# Patient Record
Sex: FEMALE | Race: WHITE
[De-identification: names, ages, dates, MRNs, and addresses within clinical notes are randomized per-mention and may not be internally consistent; named-entity substitution may affect disease eponyms.]

---

## 2017-01-24 ENCOUNTER — HOSPITAL ENCOUNTER (OUTPATIENT)
Dept: HOSPITAL 62 - OD | Age: 47
End: 2017-01-24
Attending: PHYSICIAN ASSISTANT
Payer: COMMERCIAL

## 2017-01-24 DIAGNOSIS — E87.5: Primary | ICD-10-CM

## 2017-01-24 LAB
ANION GAP SERPL CALC-SCNC: 13 MMOL/L (ref 5–19)
BUN SERPL-MCNC: 17 MG/DL (ref 7–20)
CALCIUM: 9.9 MG/DL (ref 8.4–10.2)
CHLORIDE SERPL-SCNC: 102 MMOL/L (ref 98–107)
CO2 SERPL-SCNC: 23 MMOL/L (ref 22–30)
CREAT SERPL-MCNC: 0.96 MG/DL (ref 0.52–1.25)
GLUCOSE SERPL-MCNC: 191 MG/DL (ref 75–110)
POTASSIUM SERPL-SCNC: 5.8 MMOL/L (ref 3.6–5)
SODIUM SERPL-SCNC: 137.7 MMOL/L (ref 137–145)

## 2017-01-24 PROCEDURE — 80048 BASIC METABOLIC PNL TOTAL CA: CPT

## 2017-01-24 PROCEDURE — 36415 COLL VENOUS BLD VENIPUNCTURE: CPT

## 2019-09-15 ENCOUNTER — HOSPITAL ENCOUNTER (OUTPATIENT)
Dept: HOSPITAL 62 - ER | Age: 49
Setting detail: OBSERVATION
LOS: 4 days | Discharge: HOME | End: 2019-09-19
Attending: INTERNAL MEDICINE | Admitting: FAMILY MEDICINE
Payer: COMMERCIAL

## 2019-09-15 DIAGNOSIS — R11.2: ICD-10-CM

## 2019-09-15 DIAGNOSIS — Z79.82: ICD-10-CM

## 2019-09-15 DIAGNOSIS — E86.0: ICD-10-CM

## 2019-09-15 DIAGNOSIS — D72.829: ICD-10-CM

## 2019-09-15 DIAGNOSIS — E78.5: ICD-10-CM

## 2019-09-15 DIAGNOSIS — R79.89: ICD-10-CM

## 2019-09-15 DIAGNOSIS — E66.9: ICD-10-CM

## 2019-09-15 DIAGNOSIS — R61: ICD-10-CM

## 2019-09-15 DIAGNOSIS — N18.2: ICD-10-CM

## 2019-09-15 DIAGNOSIS — Z79.899: ICD-10-CM

## 2019-09-15 DIAGNOSIS — F17.210: ICD-10-CM

## 2019-09-15 DIAGNOSIS — Z79.4: ICD-10-CM

## 2019-09-15 DIAGNOSIS — Z90.49: ICD-10-CM

## 2019-09-15 DIAGNOSIS — R01.1: ICD-10-CM

## 2019-09-15 DIAGNOSIS — K21.9: ICD-10-CM

## 2019-09-15 DIAGNOSIS — R19.7: ICD-10-CM

## 2019-09-15 DIAGNOSIS — R00.0: ICD-10-CM

## 2019-09-15 DIAGNOSIS — E11.65: ICD-10-CM

## 2019-09-15 DIAGNOSIS — R60.0: ICD-10-CM

## 2019-09-15 DIAGNOSIS — H81.10: Primary | ICD-10-CM

## 2019-09-15 DIAGNOSIS — I12.9: ICD-10-CM

## 2019-09-15 DIAGNOSIS — Z82.49: ICD-10-CM

## 2019-09-15 DIAGNOSIS — E11.22: ICD-10-CM

## 2019-09-15 LAB
ABSOLUTE LYMPHOCYTES# (MANUAL): 1.4 10^3/UL (ref 0.5–4.7)
ABSOLUTE MONOCYTES # (MANUAL): 0.5 10^3/UL (ref 0.1–1.4)
ADD MANUAL DIFF: YES
ALBUMIN SERPL-MCNC: 4.3 G/DL (ref 3.5–5)
ALP SERPL-CCNC: 74 U/L (ref 38–126)
AMYLASE SERPL-CCNC: 46 U/L (ref 30–110)
ANION GAP SERPL CALC-SCNC: 16 MMOL/L (ref 5–19)
ANION GAP SERPL CALC-SCNC: 19 MMOL/L (ref 5–19)
APPEARANCE UR: CLEAR
APTT BLD: 25.5 SEC (ref 23.5–35.8)
APTT PPP: (no result) S
AST SERPL-CCNC: 22 U/L (ref 14–36)
BARBITURATES UR QL SCN: NEGATIVE
BASE EXCESS BLDV CALC-SCNC: -6.6 MMOL/L
BASOPHILS NFR BLD MANUAL: 1 % (ref 0–2)
BILIRUB DIRECT SERPL-MCNC: 0.2 MG/DL (ref 0–0.4)
BILIRUB SERPL-MCNC: 0.7 MG/DL (ref 0.2–1.3)
BILIRUB UR QL STRIP: NEGATIVE
BUN SERPL-MCNC: 14 MG/DL (ref 7–20)
BUN SERPL-MCNC: 16 MG/DL (ref 7–20)
CALCIUM: 9 MG/DL (ref 8.4–10.2)
CALCIUM: 9.7 MG/DL (ref 8.4–10.2)
CHLORIDE SERPL-SCNC: 92 MMOL/L (ref 98–107)
CHLORIDE SERPL-SCNC: 98 MMOL/L (ref 98–107)
CO2 SERPL-SCNC: 23 MMOL/L (ref 22–30)
CO2 SERPL-SCNC: 23 MMOL/L (ref 22–30)
EOSINOPHIL NFR BLD MANUAL: 0 % (ref 0–6)
ERYTHROCYTE [DISTWIDTH] IN BLOOD BY AUTOMATED COUNT: 13.3 % (ref 11.5–14)
GLUCOSE SERPL-MCNC: 345 MG/DL (ref 75–110)
GLUCOSE SERPL-MCNC: 470 MG/DL (ref 75–110)
GLUCOSE UR STRIP-MCNC: >=1000 MG/DL
HCO3 BLDV-SCNC: 18.8 MMOL/L (ref 20–32)
HCT VFR BLD CALC: 40.5 % (ref 36–47)
HGB BLD-MCNC: 13.8 G/DL (ref 12–15.5)
INR PPP: 1.11
KETONES UR STRIP-MCNC: 100 MG/DL
MCH RBC QN AUTO: 28.4 PG (ref 27–33.4)
MCHC RBC AUTO-ENTMCNC: 34.1 G/DL (ref 32–36)
MCV RBC AUTO: 83 FL (ref 80–97)
METHADONE UR QL SCN: NEGATIVE
MONOCYTES % (MANUAL): 2 % (ref 3–13)
NITRITE UR QL STRIP: NEGATIVE
OVALOCYTES BLD QL SMEAR: SLIGHT
PCO2 BLDV: 37.6 MMHG (ref 35–63)
PCP UR QL SCN: NEGATIVE
PH BLDV: 7.32 [PH] (ref 7.3–7.42)
PH UR STRIP: 6 [PH] (ref 5–9)
PLATELET # BLD: 440 10^3/UL (ref 150–450)
PLATELET COMMENT: ADEQUATE
POIKILOCYTOSIS BLD QL SMEAR: SLIGHT
POTASSIUM SERPL-SCNC: 3.7 MMOL/L (ref 3.6–5)
POTASSIUM SERPL-SCNC: 4.6 MMOL/L (ref 3.6–5)
PROT SERPL-MCNC: 7.3 G/DL (ref 6.3–8.2)
PROT UR STRIP-MCNC: 30 MG/DL
PROTHROMBIN TIME: 14.3 SEC (ref 11.4–15.4)
RBC # BLD AUTO: 4.86 10^6/UL (ref 3.72–5.28)
SEGMENTED NEUTROPHILS % (MAN): 91 % (ref 42–78)
SP GR UR STRIP: 1.02
TOTAL CELLS COUNTED BLD: 100
URINE AMPHETAMINES SCREEN: NEGATIVE
URINE BENZODIAZEPINES SCREEN: NEGATIVE
URINE COCAINE SCREEN: NEGATIVE
URINE MARIJUANA (THC) SCREEN: NEGATIVE
UROBILINOGEN UR-MCNC: NEGATIVE MG/DL (ref ?–2)
VARIANT LYMPHS NFR BLD MANUAL: 6 % (ref 13–45)
WBC # BLD AUTO: 22.6 10^3/UL (ref 4–10.5)

## 2019-09-15 PROCEDURE — 82550 ASSAY OF CK (CPK): CPT

## 2019-09-15 PROCEDURE — 81001 URINALYSIS AUTO W/SCOPE: CPT

## 2019-09-15 PROCEDURE — 96361 HYDRATE IV INFUSION ADD-ON: CPT

## 2019-09-15 PROCEDURE — 87040 BLOOD CULTURE FOR BACTERIA: CPT

## 2019-09-15 PROCEDURE — 96374 THER/PROPH/DIAG INJ IV PUSH: CPT

## 2019-09-15 PROCEDURE — 85610 PROTHROMBIN TIME: CPT

## 2019-09-15 PROCEDURE — 83036 HEMOGLOBIN GLYCOSYLATED A1C: CPT

## 2019-09-15 PROCEDURE — 99284 EMERGENCY DEPT VISIT MOD MDM: CPT

## 2019-09-15 PROCEDURE — 83605 ASSAY OF LACTIC ACID: CPT

## 2019-09-15 PROCEDURE — 85730 THROMBOPLASTIN TIME PARTIAL: CPT

## 2019-09-15 PROCEDURE — 81025 URINE PREGNANCY TEST: CPT

## 2019-09-15 PROCEDURE — 83690 ASSAY OF LIPASE: CPT

## 2019-09-15 PROCEDURE — 85025 COMPLETE CBC W/AUTO DIFF WBC: CPT

## 2019-09-15 PROCEDURE — 80053 COMPREHEN METABOLIC PANEL: CPT

## 2019-09-15 PROCEDURE — 97163 PT EVAL HIGH COMPLEX 45 MIN: CPT

## 2019-09-15 PROCEDURE — 80307 DRUG TEST PRSMV CHEM ANLYZR: CPT

## 2019-09-15 PROCEDURE — 82272 OCCULT BLD FECES 1-3 TESTS: CPT

## 2019-09-15 PROCEDURE — 70544 MR ANGIOGRAPHY HEAD W/O DYE: CPT

## 2019-09-15 PROCEDURE — 82803 BLOOD GASES ANY COMBINATION: CPT

## 2019-09-15 PROCEDURE — 82553 CREATINE MB FRACTION: CPT

## 2019-09-15 PROCEDURE — 96375 TX/PRO/DX INJ NEW DRUG ADDON: CPT

## 2019-09-15 PROCEDURE — 70553 MRI BRAIN STEM W/O & W/DYE: CPT

## 2019-09-15 PROCEDURE — G0378 HOSPITAL OBSERVATION PER HR: HCPCS

## 2019-09-15 PROCEDURE — 36415 COLL VENOUS BLD VENIPUNCTURE: CPT

## 2019-09-15 PROCEDURE — 71046 X-RAY EXAM CHEST 2 VIEWS: CPT

## 2019-09-15 PROCEDURE — 93005 ELECTROCARDIOGRAM TRACING: CPT

## 2019-09-15 PROCEDURE — 97116 GAIT TRAINING THERAPY: CPT

## 2019-09-15 PROCEDURE — 80061 LIPID PANEL: CPT

## 2019-09-15 PROCEDURE — 82150 ASSAY OF AMYLASE: CPT

## 2019-09-15 PROCEDURE — 80076 HEPATIC FUNCTION PANEL: CPT

## 2019-09-15 PROCEDURE — 87045 FECES CULTURE AEROBIC BACT: CPT

## 2019-09-15 PROCEDURE — 87205 SMEAR GRAM STAIN: CPT

## 2019-09-15 PROCEDURE — 80048 BASIC METABOLIC PNL TOTAL CA: CPT

## 2019-09-15 PROCEDURE — 82962 GLUCOSE BLOOD TEST: CPT

## 2019-09-15 PROCEDURE — 93010 ELECTROCARDIOGRAM REPORT: CPT

## 2019-09-15 PROCEDURE — 87086 URINE CULTURE/COLONY COUNT: CPT

## 2019-09-15 PROCEDURE — A9576 INJ PROHANCE MULTIPACK: HCPCS

## 2019-09-15 PROCEDURE — 89055 LEUKOCYTE ASSESSMENT FECAL: CPT

## 2019-09-15 PROCEDURE — 84484 ASSAY OF TROPONIN QUANT: CPT

## 2019-09-15 PROCEDURE — 83735 ASSAY OF MAGNESIUM: CPT

## 2019-09-15 RX ADMIN — SODIUM CHLORIDE PRN MLS/HR: 9 INJECTION, SOLUTION INTRAVENOUS at 11:16

## 2019-09-15 RX ADMIN — INSULIN LISPRO SCH UNIT: 100 INJECTION, SOLUTION INTRAVENOUS; SUBCUTANEOUS at 16:32

## 2019-09-15 RX ADMIN — ASPIRIN SCH MG: 81 TABLET, COATED ORAL at 16:34

## 2019-09-15 RX ADMIN — SODIUM CHLORIDE PRN MLS/HR: 9 INJECTION, SOLUTION INTRAVENOUS at 09:47

## 2019-09-15 RX ADMIN — SODIUM CHLORIDE PRN MLS/HR: 9 INJECTION, SOLUTION INTRAVENOUS at 16:31

## 2019-09-15 RX ADMIN — INSULIN LISPRO SCH: 100 INJECTION, SOLUTION INTRAVENOUS; SUBCUTANEOUS at 22:18

## 2019-09-15 RX ADMIN — AMLODIPINE BESYLATE SCH MG: 10 TABLET ORAL at 16:34

## 2019-09-15 RX ADMIN — INSULIN GLARGINE SCH UNIT: 100 INJECTION, SOLUTION SUBCUTANEOUS at 22:17

## 2019-09-15 NOTE — ER DOCUMENT REPORT
ED Medical Screen (RME)





- General


TRAVEL OUTSIDE OF THE [place] IN LAST 30 DAYS: No





- General


Chief Complaint: Nausea/Vomiting/Diarrhea


Stated Complaint: VOMITING


Time Seen by Provider: 09/15/19 09:16


Primary Care Provider: 


ARIAS BOLIVAR MD [Primary Care Provider] - Follow up as needed





- [facility]


Notes: 





09/15/19 09:16


Patient is a 48-year-old female with history of hypertension and diabetes who 

presents complaining of nausea, vomiting, watery diarrhea that began yesterday. 

Patient states that she has vomited over 10 times.  She has not noticed any 

melena, hematochezia, or hematemesis.  She has not checked her sugar recently.  

She has never hospitalized for HHS/DKA.  Denies HA, fever, neck pain, URI, CP, 

SOB, Abd pain, dysuria, back pain, or rash.





I have treated and performed a rapid initial assessment of this patient.  A 

comprehensive ED assessment and evaluation of the patient, analysis of test 

results and completion of medical decision making process will be conducted by 

additional ED providers.





PHYSICAL EXAMINATION:





GENERAL: Well-appearing, well-nourished and in no acute distress.  A&Ox4.  

Answers questions appropriately.





LUNGS: Breath sounds clear to auscultation bilaterally and equal.  No wheezes 

rales or rhonchi.





HEART: Regular rate and rhythm without murmurs, rubs, gallops.





ABDOMEN: Soft, nondistended abdomen.  No guarding, no rebound.  Normal bowel 

sounds present.  No CVA tenderness bilaterally.  grossly nontender  (cannot 

elicit thorough abd exam w/o bed, however).





Extremities:  No cyanosis, clubbing, or edema b/l.  





NEUROLOGICAL: Normal speech, normal gait. 





PSYCH: flat affect





 (JESSIKA,KAZ)





- Related Data


Allergies/Adverse Reactions: 


                                        





acetaminophen [From Vicodin] Allergy (Severe, Verified 09/15/19 08:51)


   Rash, SOB; N&V, Muscle weakness, cold sweats, paleness,


hydrocodone bitartrate [From Vicodin] Allergy (Severe, Verified 09/15/19 08:51)


   Rash, SOB; N&V, Muscle weakness, cold sweats, paleness,











Past Medical History





- Past Medical History


Cardiac Medical History: Reports: Hx Hypertension - meds less than one yr-well 

controlled


   Denies: Hx Coronary Artery Disease, Hx Heart Attack


Pulmonary Medical History: Reports: Hx Pneumonia - 2008


   Denies: Hx Asthma, Hx Bronchitis, Hx COPD


Neurological Medical History: Denies: Hx Cerebrovascular Accident, Hx Seizures


Musculoskeltal Medical History: Denies Hx Arthritis


Past Surgical History: Denies: Hx Pacemaker





- Immunizations


Hx Diphtheria, Pertussis, Tetanus Vaccination: Yes - 2005





Physical Exam





- Vital signs


Vitals: 





                                        











Temp Pulse Resp BP Pulse Ox


 


 97.6 F   100   16   146/80 H  97 


 


 09/15/19 08:55  09/15/19 08:55  09/15/19 08:55  09/15/19 08:55  09/15/19 08:55














Course





- Vital Signs


Vital signs: 





                                        











Temp Pulse Resp BP Pulse Ox


 


 97.6 F   100   16   146/80 H  97 


 


 09/15/19 08:55  09/15/19 08:55  09/15/19 08:55  09/15/19 08:55  09/15/19 08:55














- Laboratory


Laboratory results interpreted by me: 





                                        











  09/15/19





  09:18


 


POC Glucose  422 H*














Doctor's Discharge





- Discharge


Referrals: 


ARIAS BOLIVAR MD [Primary Care Provider] - Follow up as needed

## 2019-09-15 NOTE — ER DOCUMENT REPORT
ED General





- General


Chief Complaint: Nausea/Vomiting/Diarrhea


Stated Complaint: VOMITING


Time Seen by Provider: 09/15/19 09:16


TRAVEL OUTSIDE OF THE [place] IN LAST 30 DAYS: No





- HPI


Patient complains to provider of: dizziness


Onset: Yesterday


Quality of pain: No pain


Severity: Moderate


Pain Level: 2


Context: 





48 year old with htn, dm, and hld presents with complaints of dizziness since 

about 7 pm yesterday.  She has persistent nausea and also some loose watery 

stool.  No fever or chills.  No travel.  No bad food exposure.  


Associated symptoms: Diarrhea, Nausea, Vomiting


Relieved by: Denies





- Related Data


Allergies/Adverse Reactions: 


                                        





acetaminophen [From Vicodin] Allergy (Severe, Verified 09/15/19 08:51)


   Rash, SOB; N&V, Muscle weakness, cold sweats, paleness,


hydrocodone bitartrate [From Vicodin] Allergy (Severe, Verified 09/15/19 08:51)


   Rash, SOB; N&V, Muscle weakness, cold sweats, paleness,











Past Medical History





- Social History


Smoking Status: Unknown if Ever Smoked


Family History: DM, Hypertension





- Past Medical History


Cardiac Medical History: Reports: Hx Hypertension - meds less than one yr-well 

controlled


   Denies: Hx Coronary Artery Disease, Hx Heart Attack


Pulmonary Medical History: Reports: Hx Pneumonia - 2008


   Denies: Hx Asthma, Hx Bronchitis, Hx COPD


Neurological Medical History: Denies: Hx Cerebrovascular Accident, Hx Seizures


Musculoskeletal Medical History: Denies Hx Arthritis


Past Surgical History: Denies: Hx Pacemaker





- Immunizations


Hx Diphtheria, Pertussis, Tetanus Vaccination: Yes - 2005


Hx Pneumococcal Vaccination: 10/01/09





Review of Systems





- Review of Systems


Constitutional: No symptoms reported


EENT: No symptoms reported


Cardiovascular: No symptoms reported


Respiratory: No symptoms reported


Gastrointestinal: No symptoms reported


Genitourinary: No symptoms reported


Female Genitourinary: No symptoms reported


Musculoskeletal: No symptoms reported


Skin: No symptoms reported


Hematologic/Lymphatic: No symptoms reported


Neurological/Psychological: No symptoms reported





Physical Exam





- Vital signs


Vitals: 


                                        











Temp Pulse Resp BP Pulse Ox


 


 97.6 F   100   16   146/80 H  97 


 


 09/15/19 08:55  09/15/19 08:55  09/15/19 08:55  09/15/19 08:55  09/15/19 08:55











Interpretation: Normal





- General


General appearance: Appears well, Alert





- HEENT


Head: Normocephalic, Atraumatic


Eyes: Normal


Pupils: PERRL





- Respiratory


Respiratory status: No respiratory distress


Chest status: Nontender


Breath sounds: Normal


Chest palpation: Normal





- Cardiovascular


Rhythm: Regular


Heart sounds: Normal auscultation


Murmur: No





- Abdominal


Inspection: Normal


Distension: No distension


Bowel sounds: Normal


Tenderness: Nontender


Organomegaly: No organomegaly





- Back


Back: Normal, Nontender





- Extremities


General upper extremity: Normal inspection, Nontender, Normal color, Normal ROM,

Normal temperature


General lower extremity: Normal inspection, Nontender, Normal color, Normal ROM,

Normal temperature, Normal weight bearing.  No: Alivia's sign





- Neurological


Neuro grossly intact: Yes


Cognition: Normal


Orientation: AAOx4


María Coma Scale Eye Opening: Spontaneous


[place] Coma Scale Verbal: Oriented


María Coma Scale Motor: Obeys Commands


[place] Coma Scale Total: 15


Speech: Normal


Sensory: Normal





- Psychological


Associated symptoms: Normal affect, Normal mood





- Skin


Skin Temperature: Warm


Skin Moisture: Dry


Skin Color: Normal





Course





- Re-evaluation


Re-evalutation: 





09/15/19 12:39


48 year old with DM/ htn and hld presents with complaints of acute onset of 

dizziness and nausea and vomiting.  Nausea/ vomiting improved after antiemetics 

here but after ambulating had recurrance of nausea with nonbloody nonbilious em

esis.  Abd is nontender.  Feel labrinynitis is likely diagnosis but has gap on 

metabolic panel.  Will recheck after 2 liters ivf.   Also administered 

antiemetics after vomited again with movement here. 


09/15/19 13:07


Discussed with the pt the opportunity of observation admission and she would 

just as soon go home.  No fever or chills and no abd pain here today.  


09/15/19 14:21


Pt still feels poorly she tells me.  Does not feel comfortable going home.  Will

call the covering doctor for Dr. Heredia who she sees as a PCP. 


09/15/19 14:39


I just disucssed with Dr JOHNSTON and he has graciously agreed to see and evaluate for 

admission. 





- Vital Signs


Vital signs: 


                                        











Temp Pulse Resp BP Pulse Ox


 


 98.8 F   91   16   118/59 L  95 


 


 09/15/19 13:35  09/15/19 13:35  09/15/19 13:35  09/15/19 13:35  09/15/19 13:35














- Laboratory


Result Diagrams: 


                                 09/15/19 09:40





                                 09/15/19 12:34


Laboratory results interpreted by me: 


                                        











  09/15/19 09/15/19 09/15/19





  09:18 09:40 09:40


 


WBC   22.6 H 


 


Seg Neuts % (Manual)   91 H 


 


Lymphocytes % (Manual)   6 L 


 


Monocytes % (Manual)   2 L 


 


Abs Neuts (Manual)   20.6 H 


 


VBG HCO3   


 


Sodium    133.5 L


 


Chloride    92 L


 


Glucose    470 H*


 


POC Glucose  422 H*  


 


Urine Protein   


 


Urine Glucose (UA)   


 


Urine Ketones   














  09/15/19 09/15/19 09/15/19





  09:40 11:40 12:34


 


WBC   


 


Seg Neuts % (Manual)   


 


Lymphocytes % (Manual)   


 


Monocytes % (Manual)   


 


Abs Neuts (Manual)   


 


VBG HCO3  18.8 L  


 


Sodium    136.5 L


 


Chloride   


 


Glucose    345 H


 


POC Glucose   


 


Urine Protein   30 H 


 


Urine Glucose (UA)   >=1000 H 


 


Urine Ketones   100 H 














- EKG Interpretation by Me


EKG shows normal: Sinus rhythm, Axis


Rate: Normal


When compared to previous EKG there are: No significant change - NSR NL axis no 

st elevation or depression nonspecific repolarization abnormality





Discharge





- Discharge


Clinical Impression: 


 Dehydration





Vomiting


Qualifiers:


 Vomiting type: unspecified Vomiting Intractability: unspecified Nausea 

presence: with nausea Qualified Code(s): R11.2 - Nausea with vomiting, 

unspecified





Hyperglycemia due to type 2 diabetes mellitus


Qualifiers:


 Diabetes mellitus long term insulin use: with long term use Qualified Code(s): 

E11.65 - Type 2 diabetes mellitus with hyperglycemia





Condition: Good


Disposition: ADMITTED AS INPATIENT


Admitting Provider: Ubaldo


Unit Admitted: Medical Floor

## 2019-09-16 LAB
ABSOLUTE LYMPHOCYTES# (MANUAL): 3.8 10^3/UL (ref 0.5–4.7)
ABSOLUTE MONOCYTES # (MANUAL): 0.4 10^3/UL (ref 0.1–1.4)
ADD MANUAL DIFF: YES
ALBUMIN SERPL-MCNC: 3.3 G/DL (ref 3.5–5)
ALP SERPL-CCNC: 54 U/L (ref 38–126)
ANION GAP SERPL CALC-SCNC: 9 MMOL/L (ref 5–19)
AST SERPL-CCNC: 14 U/L (ref 14–36)
BASOPHILS NFR BLD MANUAL: 0 % (ref 0–2)
BILIRUB DIRECT SERPL-MCNC: 0.1 MG/DL (ref 0–0.4)
BILIRUB SERPL-MCNC: 0.5 MG/DL (ref 0.2–1.3)
BUN SERPL-MCNC: 12 MG/DL (ref 7–20)
CALCIUM: 8.7 MG/DL (ref 8.4–10.2)
CHLORIDE SERPL-SCNC: 100 MMOL/L (ref 98–107)
CHOLEST SERPL-MCNC: 75.22 MG/DL (ref 0–200)
CK MB SERPL-MCNC: 0.3 NG/ML (ref ?–4.55)
CK MB SERPL-MCNC: 0.41 NG/ML (ref ?–4.55)
CO2 SERPL-SCNC: 27 MMOL/L (ref 22–30)
EOSINOPHIL NFR BLD MANUAL: 1 % (ref 0–6)
ERYTHROCYTE [DISTWIDTH] IN BLOOD BY AUTOMATED COUNT: 13.6 % (ref 11.5–14)
GLUCOSE SERPL-MCNC: 236 MG/DL (ref 75–110)
HCT VFR BLD CALC: 36.6 % (ref 36–47)
HGB BLD-MCNC: 12.6 G/DL (ref 12–15.5)
LDLC SERPL DIRECT ASSAY-MCNC: 43 MG/DL (ref ?–100)
MCH RBC QN AUTO: 28.4 PG (ref 27–33.4)
MCHC RBC AUTO-ENTMCNC: 34.4 G/DL (ref 32–36)
MCV RBC AUTO: 83 FL (ref 80–97)
MONOCYTES % (MANUAL): 2 % (ref 3–13)
NEUTS BAND NFR BLD MANUAL: 2 % (ref 3–5)
PLATELET # BLD: 397 10^3/UL (ref 150–450)
PLATELET COMMENT: ADEQUATE
POTASSIUM SERPL-SCNC: 3.7 MMOL/L (ref 3.6–5)
PROT SERPL-MCNC: 6.1 G/DL (ref 6.3–8.2)
RBC # BLD AUTO: 4.43 10^6/UL (ref 3.72–5.28)
RBC MORPH BLD: (no result)
SEGMENTED NEUTROPHILS % (MAN): 78 % (ref 42–78)
TOTAL CELLS COUNTED BLD: 100
TRIGL SERPL-MCNC: 126 MG/DL (ref ?–150)
TROPONIN I SERPL-MCNC: 0.09 NG/ML
TROPONIN I SERPL-MCNC: 0.12 NG/ML
VARIANT LYMPHS NFR BLD MANUAL: 17 % (ref 13–45)
VLDLC SERPL CALC-MCNC: 25 MG/DL (ref 10–31)
WBC # BLD AUTO: 22.1 10^3/UL (ref 4–10.5)

## 2019-09-16 RX ADMIN — AMLODIPINE BESYLATE SCH MG: 10 TABLET ORAL at 09:14

## 2019-09-16 RX ADMIN — INSULIN LISPRO SCH: 100 INJECTION, SOLUTION INTRAVENOUS; SUBCUTANEOUS at 22:28

## 2019-09-16 RX ADMIN — INSULIN LISPRO SCH UNIT: 100 INJECTION, SOLUTION INTRAVENOUS; SUBCUTANEOUS at 11:56

## 2019-09-16 RX ADMIN — INSULIN LISPRO SCH UNIT: 100 INJECTION, SOLUTION INTRAVENOUS; SUBCUTANEOUS at 07:32

## 2019-09-16 RX ADMIN — PANTOPRAZOLE SODIUM SCH MG: 40 TABLET, DELAYED RELEASE ORAL at 11:55

## 2019-09-16 RX ADMIN — SODIUM CHLORIDE PRN MLS/HR: 9 INJECTION, SOLUTION INTRAVENOUS at 22:28

## 2019-09-16 RX ADMIN — SODIUM CHLORIDE PRN MLS/HR: 9 INJECTION, SOLUTION INTRAVENOUS at 05:12

## 2019-09-16 RX ADMIN — INSULIN LISPRO SCH UNIT: 100 INJECTION, SOLUTION INTRAVENOUS; SUBCUTANEOUS at 16:34

## 2019-09-16 RX ADMIN — ASPIRIN SCH MG: 81 TABLET, COATED ORAL at 09:14

## 2019-09-16 RX ADMIN — INSULIN GLARGINE SCH UNIT: 100 INJECTION, SOLUTION SUBCUTANEOUS at 22:26

## 2019-09-16 RX ADMIN — ENOXAPARIN SODIUM SCH MG: 40 INJECTION SUBCUTANEOUS at 09:15

## 2019-09-16 RX ADMIN — SODIUM CHLORIDE PRN MLS/HR: 9 INJECTION, SOLUTION INTRAVENOUS at 16:34

## 2019-09-16 NOTE — RADIOLOGY REPORT (SQ)
EXAM DESCRIPTION:  MRA HEAD WITHOUT; MRI HEAD COMBO



COMPLETED DATE/TIME:  9/16/2019 11:32 am



REASON FOR STUDY:  dizziness; dizziness



COMPARISON:  None.



TECHNIQUE:   Multiplanar imaging includes noncontrasted T1, T2, FLAIR, and Diffusion with ADC map seq
uences. Contrast enhanced T1 images. Images stored on PACS.



CONTRAST TYPE AND DOSE:  15 mL Dotarem.



RENAL FUNCTION:  Not indicated.  ACR Type II contrast agent associated with few, if any, unconfounded
 cases of NSF



LIMITATIONS:  None.



FINDINGS:  ANATOMY: No anomalies. Normal vascular flow voids. Pituitary fossa normal.

CSF SPACES: Normal size and contour. No hemorrhage.

CEREBRUM: A few high-signal intensity lesions scattered throughout the white matter on FLAIR imaging 
with distribution suggesting chronic microvascular ischemic change. Sulci and gyri normal in size and
 contour. No evidence of hemorrhage, mass or extraaxial fluid collection. No enhancing lesions.

POSTERIOR FOSSA: No signal alteration. No hemorrhage. No edema, masses or mass effect. Internal audit
ory canals, cerebello-pontine angles, mastoids normal.

DIFFUSION: Negative for acute or subacute infarction.

ORBITS: No masses. Globes normal.

PARANASAL SINUSES: No fluid levels. Mucosa normal.

OTHER: No other significant finding.

 TECHNIQUE:

Axial 3-D time-of-flight acquisition imaging performed through the brain in the area of the [place]
 [place].  Images reformatted using 3-D MIPS.

FINDINGS:

SOURCE IMAGES: No unexpected findings on source images.  No large masses.

3-D MIP: No aneurysm.  No occlusions.  No significant stenosis.

OTHER: No other significant finding.

IMPRESSION:

NORMAL MRA OF THE [place].



IMPRESSION:  NO ENHANCING LESIONS. MINIMAL MICROVASCULAR ISCHEMIC CHANGE. OTHERWISE NORMAL STUDY.

EVIDENCE OF ACUTE STROKE: NO.



TECHNICAL DOCUMENTATION:  JOB ID:  [id number]

 2011 "[employer]- All Rights Reserved



Reading location - IP/workstation name: [email or web address]

## 2019-09-16 NOTE — RADIOLOGY REPORT (SQ)
EXAM DESCRIPTION:  CHEST 2 VIEWS



COMPLETED DATE/TIME:  9/16/2019 4:11 pm



REASON FOR STUDY:  sepsis



COMPARISON:  1/27/2009.



EXAM PARAMETERS:  NUMBER OF VIEWS: two views

TECHNIQUE: Digital Frontal and Lateral radiographic views of the chest acquired.

RADIATION DOSE: NA

LIMITATIONS: none



FINDINGS:  LUNGS AND PLEURA: No opacities, masses or pneumothorax. No pleural effusion.

MEDIASTINUM AND HILAR STRUCTURES: No masses or contour abnormalities.

HEART AND VASCULAR STRUCTURES: Heart upper limits of normal size.  No evidence for failure.

BONES: No acute findings.

HARDWARE: None in the chest.

OTHER: No other significant finding.



IMPRESSION:  NO ACUTE RADIOGRAPHIC FINDING IN THE CHEST.



TECHNICAL DOCUMENTATION:  JOB ID:  [id number]

 2011 [employer]- All Rights Reserved



Reading location - IP/workstation name: [email or web address]

## 2019-09-16 NOTE — RADIOLOGY REPORT (SQ)
EXAM DESCRIPTION:  MRA HEAD WITHOUT; MRI HEAD COMBO



COMPLETED DATE/TIME:  9/16/2019 11:32 am



REASON FOR STUDY:  dizziness; dizziness



COMPARISON:  None.



TECHNIQUE:   Multiplanar imaging includes noncontrasted T1, T2, FLAIR, and Diffusion with ADC map seq
uences. Contrast enhanced T1 images. Images stored on PACS.



CONTRAST TYPE AND DOSE:  15 mL Dotarem.



RENAL FUNCTION:  Not indicated.  ACR Type II contrast agent associated with few, if any, unconfounded
 cases of NSF



LIMITATIONS:  None.



FINDINGS:  ANATOMY: No anomalies. Normal vascular flow voids. Pituitary fossa normal.

CSF SPACES: Normal size and contour. No hemorrhage.

CEREBRUM: A few high-signal intensity lesions scattered throughout the white matter on FLAIR imaging 
with distribution suggesting chronic microvascular ischemic change. Sulci and gyri normal in size and
 contour. No evidence of hemorrhage, mass or extraaxial fluid collection. No enhancing lesions.

POSTERIOR FOSSA: No signal alteration. No hemorrhage. No edema, masses or mass effect. Internal audit
ory canals, cerebello-pontine angles, mastoids normal.

DIFFUSION: Negative for acute or subacute infarction.

ORBITS: No masses. Globes normal.

PARANASAL SINUSES: No fluid levels. Mucosa normal.

OTHER: No other significant finding.

 TECHNIQUE:

Axial 3-D time-of-flight acquisition imaging performed through the brain in the area of the [place]
 [place].  Images reformatted using 3-D MIPS.

FINDINGS:

SOURCE IMAGES: No unexpected findings on source images.  No large masses.

3-D MIP: No aneurysm.  No occlusions.  No significant stenosis.

OTHER: No other significant finding.

IMPRESSION:

NORMAL MRA OF THE [place].



IMPRESSION:  NO ENHANCING LESIONS. MINIMAL MICROVASCULAR ISCHEMIC CHANGE. OTHERWISE NORMAL STUDY.

EVIDENCE OF ACUTE STROKE: NO.



TECHNICAL DOCUMENTATION:  JOB ID:  [id number]

 2011 [facility]- All Rights Reserved



Reading location - IP/workstation name: [email or web address]

## 2019-09-16 NOTE — EKG REPORT
SEVERITY:- BORDERLINE ECG -

SINUS RHYTHM

VENTRICULAR PREMATURE COMPLEX

LOW VOLTAGE THROUGHOUT

:

Confirmed by: Nico Ballesteros MD 16-Sep-2019 13:36:43

## 2019-09-16 NOTE — PDOC CONSULTATION
Consultation-Blank


Consultation: 





CARDIOLOGY CONSULTATION by Dr. Karina Lee on 9/16/2019.  Patient seen at

7 AM.  60 minutes spent on this patient with more than 50% of time spent in 

direct patient care.





REASON FOR CONSULTATION: Patient with elevated troponin I without chest pain or 

symptoms suggestive of typical or atypical angina.


CONSULT REQUESTING PHYSICIAN: Dr. ANGEL Heredia





HISTORY PRESENT ILLNESS: Patient is a 48-year-old with poorly controlled 

diabetes mellitus hypertension hyperlipidemia and chronic leukocytosis with 

negative flow cytometry for malignancy admitted to 24 hours duration of symptoms

of severe dizziness nausea and severe vomiting.  The patient was found to be in 

the emergency room with mildly elevated troponin I at 0.142 and was also 

dehydrated.  She also was found to have some renal insufficiency.  The patient 

denies any chest pain or discomfort.  There is no symptoms suggestive of typical

or atypical angina.  There is no palpitations.  There is no shortness of breath 

at this there is no PND orthopnea.  She has no prior history of MI or anginal 

symptoms.  There is no history of palpitations, syncope or congestive heart 

failure.  She has poorly controlled diabetes mellitus with a hemoglobin A1c of 

8.7.  She had is a smoker but has no COPD or asthma.  There is no history of 

sleep apnea.  It is stated that the patient has history of chronic kidney 

disease, but even that there is dehydrated state the patient GFR is normal.





Past Medical History


Cardiac Medical History: Reports: Hyperlipidema, Hypertension - meds less than 

one yr-well controlled


   Denies: Coronary Artery Disease, Myocardial Infarction


Pulmonary Medical History: Reports: Pneumonia - 2008


   Denies: Asthma, Bronchitis, Chronic Obstructive Pulmonary Disease (COPD)


Neurological Medical History: 


   Denies: Seizures


Endocrine Medical History: Reports: Diabetes Mellitus Type 2


Renal/ Medical History: Reports: Chronic Kidney Disease.  With the patient her

GFR is normal at present.


GI Medical History: Reports: Gastroesophageal Reflux Disease


Musculoskeltal Medical History: 


   Denies: Arthritis


Hematology: 


   Denies: Anemia.  History of chronic leukocytosis with negative flow 

cytometry.





Past Surgical History


Past Surgical History: Reports: Cholecystectomy, Orthopedic Surgery - right 

rotater cuff


   Denies: Pacemaker





Social History


Smoking Status: Current Every Day Smoker


Frequency of Alcohol Use: None


Hx Recreational Drug Use: No


Drugs: None


Hx Prescription Drug Abuse: No





- Advance Directive


Resuscitation Status: Full Code.  The patient's  is her surrogate 

healthcare decision maker.





Family History


Family History:  DM, Hypertension she states her mother had atrial fibrillation 

but there is no coronary artery disease in the family.  No history of sudden 

death.





Medication/Allergy


Home Medications: 


Amlodipine Besylate [Norvasc 10 mg Tablet] 10 mg PO DAILY 09/15/19 


Aspirin [Ecotrin 81 mg EC Tablet] 81 mg PO DAILY 09/15/19 


Insulin Detemir [Levemir] 19 unit SQ QHS 09/15/19 


Semaglutide [Ozempic] 1 mg SQ MO@2200 09/15/19 








Allergies/Adverse Reactions: hydrocodone bitartrate [From Vicodin] Allergy 

(Severe, Verified 09/15/19 08:51)


   Rash, SOB; N&V, Muscle weakness, cold sweats, paleness,





Review of Systems


Constitutional: PRESENT: night sweats.  ABSENT: chills, fever(s), headache(s), 

weight gain, weight loss


Eyes: ABSENT: visual disturbances


Ears: ABSENT: hearing changes


Cardiovascular: ABSENT: chest pain, dyspnea on exertion, edema, orthropnea, 

palpitations


Respiratory: ABSENT: cough, hemoptysis


Gastrointestinal: PRESENT: nausea.  ABSENT: abdominal pain, constipation, 

diarrhea, hematemesis, hematochezia, vomiting


Genitourinary: ABSENT: dysuria, hematuria


Musculoskeletal: ABSENT: joint swelling


Integumentary: ABSENT: rash, wounds


Neurological: PRESENT: abnormal gait, dizziness.  ABSENT: abnormal speech, 

confusion, focal weakness, syncope


Psychiatric: ABSENT: anxiety, depression, homidical ideation, suicidal ideation


Endocrine: ABSENT: cold intolerance, heat intolerance, menstrual abnormalities, 

polydipsia, polyuria


Hematologic/Lymphatic: ABSENT: easy bleeding, easy bruising, lymphadenopathy





Physical EXAMINATION: The patient is moderately obese.  At present in no acute 

distress.  She is well-groomed.


                                                                Selected Entries











  09/16/19





  07:42


 


Temperature 98.2 F


 


Temperature Oral





Source 


 


Pulse Rate 89


 


Respiratory 19





Rate 


 


Blood Pressure 156/67 H


 


Blood Pressure 96





Mean 


 


O2 Sat by Pulse 91 L





Oximetry 


 


Oxygen Delivery Room Air





Method 





HEAD: Is atraumatic normocephalic.  EYES: Pupils are equal round regular regular

reactive to light and accommodation.  Extraocular movements are normal.  There 

is no conjunctival pallor.  There is no scleral icterus.  Mouth mucous memories 

are dry the tongue is very dry.  The patient appears to be high dehydrated.  The

rest of the ENT is negative.  NECK: Is supple.  There is no JVD.  Carotids are 

equal there is no bruit.  There is no goiter.  There is no lymphadenopathy.  

There is no accessory muscles of respiration use.  Trachea central.  LUNGS: Is 

clear to auscultation percussion without any rhonchi rales wheezing.  HEART: S1-

S2 is heard.  There is no S3 gallop.  There is no S4 gallop.  There is systolic 

murmur the left sternal border and the apex there is no rub.  ABDOMEN: Is soft. 

Open obese.  Nontender.  There is no hepatospleno megaly.  Bowel sounds are well

heard.  EXTREMITIES: Femorals are well felt.  There is no femoral bruits.  Leg 

pulses are well felt there is no pedal edema.  There is no DVT or cellulitis.  

There is no calf tenderness.  CNS: The patient is conscious awake alert oriented

x3 with no focal deficits.  PSYCHIATRIC: The patient judgment insight intact her

affect is normal


The patient is EKG: Is sinus rhythm within normal limits


Current Medications











Generic Name Dose Route Start Last Admin





  Trade Name Freq  PRN Reason Stop Dose Admin


 


Acetaminophen  650 mg  09/15/19 16:46 





  Tylenol 325 Mg Tablet  PO  10/15/19 16:45 





  Q4HP PRN  





  FOR PAIN  


 


Amlodipine Besylate  10 mg  09/15/19 16:00  09/16/19 09:14





  Norvasc 10 Mg Tablet  PO  10/15/19 15:59  10 mg





  DAILY NISHANT   Administration


 


Aspirin  81 mg  09/15/19 16:00  09/16/19 09:14





  Ecotrin 81 Mg Ec Tablet  PO  10/15/19 15:59  81 mg





  DAILY NISHANT   Administration


 


Dextrose  12.5 gm  09/15/19 15:41 





  Dextrose Inj 50% Syringe (25 Gm/50 Ml)  IV  10/15/19 15:40 





  PRN PRN  





  FOR BG 50-69 IN ALERT PATIENT  





  Protocol  


 


Dextrose  25 gm  09/15/19 15:41 





  Dextrose Inj 50% Syringe (25 Gm/50 Ml)  IV  10/15/19 15:40 





  PRN PRN  





  PER PROTOCOL  





  Protocol  


 


Enoxaparin Sodium  40 mg  09/16/19 10:00  09/16/19 09:15





  Lovenox Inj 40 Mg/0.4 Ml Disp.Syrin  SUBCUT  10/16/19 09:59  40 mg





  DAILY NISHANT   Administration


 


Glucagon  1 mg  09/15/19 15:41 





  Glucagen Inj 1 Mg Vial  IM  10/15/19 15:40 





  PRN PRN  





  Evaluate for BG < 70  





  Protocol  


 


Glucose  15 gm  09/15/19 15:41 





  Glutose 40% Gel 15 Gm Tube  PO  10/15/19 15:40 





  PRN PRN  





  FOR BG 50-69 IN ALERT PATIENT  





  Protocol  


 


Glucose  30 gm  09/15/19 15:41 





  Glutose 40% Gel 15 Gm Tube  PO  10/15/19 15:40 





  PRN PRN  





  FOR BG < 50 IN ALERT PATIENT   





  Protocol  


 


Sodium Chloride  1,000 mls @ 125 mls/hr  09/16/19 15:16  09/16/19 22:28





  Nacl 0.9% 1000 Ml Iv Soln  IV  10/15/19 15:36  125 mls/hr





  CONTINUOUS PRN   Administration





  THIS MED IS NOT "PRN"  


 


Insulin Glargine  19 unit  09/15/19 22:00  09/16/19 22:26





  Lantus Insulin 100 Unit/1 Ml 10 Ml  SUBCUT  10/15/19 21:59  19 unit





  QHS NISHANT   Administration


 


Insulin Human Lispro  0 - 12 unit  09/15/19 16:00  09/16/19 22:28





  Humalog Insulin 100 Unit/1 Ml 3 Ml Vial  SUBCUT  10/15/19 15:59  Not Given





  [facility]  





  Protocol  


 


Meclizine HCl  25 mg  09/16/19 07:41 





  Antivert 25 Mg Tablet  PO  10/16/19 07:40 





  Q8HP PRN  





  DIZZINESS  


 


Ondansetron HCl  4 mg  09/15/19 16:15  09/15/19 16:31





  Zofran Inj/Pf 4 Mg/2 Ml Sdv  IV  10/15/19 16:14  4 mg





  Q4HP PRN   Administration





  FOR NAUSEA/VOMITING  


 


Pantoprazole Sodium  40 mg  09/16/19 11:00  09/16/19 11:55





  Protonix 40 Mg Dr Tablet  PO  10/16/19 10:59  40 mg





  Q6AM NISHANT   Administration


 


Patient Own Medication  1 mg  09/16/19 22:00 





  Semaglutide [Ozempic]  SQ  10/16/19 21:59 





  MO@2200 [facility]  














Discontinued Medications














Generic Name Dose Route Start Last Admin





  Trade Name Freq  PRN Reason Stop Dose Admin


 


Sodium Chloride  1,000 mls @ 0 mls/hr  09/15/19 09:18  09/15/19 11:17





  Nacl 0.9% 1000 Ml Iv Soln  IV   Infused





  X 2 BAGS PRN   Infusion





  THIS MED IS NOT "PRN"  





  Wide Open  


 


Sodium Chloride  1,000 mls @ 150 mls/hr  09/15/19 15:37  09/16/19 16:35





  Nacl 0.9% 1000 Ml Iv Soln  IV  10/15/19 15:36  Infused





  CONTINUOUS PRN   Infusion





  THIS MED IS NOT "PRN"  


 


Insulin Glargine  Confirm  09/15/19 22:13  09/15/19 22:18





  Lantus (Pyxis) Insulin 100 Unit/1 Ml 10 Ml  Administered  09/15/19 22:14  Not 

Given





  Dose  





  1 unit  





  SUBCUT  





  .STK-MED ONE  


 


Insulin Glargine  Confirm  09/16/19 22:25  09/16/19 22:36





  Lantus (Pyxis) Insulin 100 Unit/1 Ml 10 Ml  Administered  09/16/19 22:26  Not 

Given





  Dose  





  1 unit  





  SUBCUT  





  .STK-MED ONE  


 


Insulin Human Regular  8 unit  09/15/19 10:43  09/15/19 11:14





  Humulin R (Pyxis) Insulin 100 Unit/Ml 3ml  IV  09/15/19 10:44  8 unit





  NOW ONE   Administration


 


Meclizine HCl  25 mg  09/15/19 09:35  09/15/19 09:52





  Antivert 25 Mg Tablet  PO  09/15/19 09:36  25 mg





  NOW ONE   Administration


 


Ondansetron HCl  8 mg  09/15/19 09:18  09/15/19 09:49





  Zofran Inj/Pf 4 Mg/2 Ml Sdv  IV  09/15/19 09:19  8 mg





  NOW ONE   Administration


 


Promethazine HCl  12.5 mg  09/15/19 11:51  09/15/19 11:59





  Phenergan Inj 25 Mg/1 Ml Vial  IV  09/15/19 11:52  12.5 mg





  NOW ONE   Administration


 


Promethazine HCl  Confirm  09/15/19 11:51  09/15/19 12:00





  Phenergan Inj 25 Mg/1 Ml Vial  Administered  09/15/19 11:52  Not Given





  Dose  





  25 mg  





  .ROUTE  





  .STK-MED ONE  














                              Labs- All tests 24 hr











  09/16/19 09/16/19 09/16/19





  05:18 05:18 05:18


 


WBC   22.1 H 


 


RBC   4.43 


 


Hgb   12.6 


 


Hct   36.6 


 


MCV   83 


 


MCH   28.4 


 


MCHC   34.4 


 


RDW   13.6 


 


Plt Count   397 


 


Lymph % (Auto)   Not Reportable 


 


Mono % (Auto)   Not Reportable 


 


Eos % (Auto)   Not Reportable 


 


Baso % (Auto)   Not Reportable 


 


Absolute Neuts (auto)   Not Reportable 


 


Absolute Lymphs (auto)   Not Reportable 


 


Absolute Monos (auto)   Not Reportable 


 


Absolute Eos (auto)   Not Reportable 


 


Absolute Basos (auto)   Not Reportable 


 


Total Counted   100 


 


Seg Neutrophils %   Not Reportable 


 


Seg Neuts % (Manual)   78 


 


Band Neutrophils %   2 L 


 


Lymphocytes % (Manual)   17 


 


Monocytes % (Manual)   2 L 


 


Eosinophils % (Manual)   1 


 


Basophils % (Manual)   0 


 


Abs Neuts (Manual)   17.7 H 


 


Abs Lymphs (Manual)   3.8 


 


Abs Monocytes (Manual)   0.4 


 


Absolute Eos (Manual)   0.2 


 


Abs Basophils (Manual)   0.0 


 


Platelet Comment   ADEQUATE 


 


RBC Morph Comment   NORMO-CYTIC/CHROMIC 


 


Sodium    135.6 L


 


Potassium    3.7


 


Chloride    100


 


Carbon Dioxide    27


 


Anion Gap    9


 


BUN    12


 


Creatinine    0.84


 


Est GFR ( Amer)    > 60


 


Est GFR (MDRD) Non-Af    > 60


 


Glucose    236 H


 


POC Glucose   


 


Hemoglobin A1c %   


 


Lactic Acid   


 


Calcium    8.7


 


Total Bilirubin    0.5


 


Direct Bilirubin    0.1


 


Neonat Total Bilirubin    Not Reportable


 


Neonat Direct Bilirubin    Not Reportable


 


Neonat Indirect Bili    Not Reportable


 


AST    14


 


ALT    17


 


Alkaline Phosphatase    54


 


Creatine Kinase   


 


CK-MB (CK-2)   


 


Troponin I  0.142  


 


Total Protein    6.1 L


 


Albumin    3.3 L


 


Triglycerides    126


 


Cholesterol    75.22


 


LDL Cholesterol Direct    43


 


VLDL Cholesterol    25.0


 


HDL Cholesterol    32 L














  09/16/19 09/16/19 09/16/19





  05:18 05:18 05:18


 


WBC   


 


RBC   


 


Hgb   


 


Hct   


 


MCV   


 


MCH   


 


MCHC   


 


RDW   


 


Plt Count   


 


Lymph % (Auto)   


 


Mono % (Auto)   


 


Eos % (Auto)   


 


Baso % (Auto)   


 


Absolute Neuts (auto)   


 


Absolute Lymphs (auto)   


 


Absolute Monos (auto)   


 


Absolute Eos (auto)   


 


Absolute Basos (auto)   


 


Total Counted   


 


Seg Neutrophils %   


 


Seg Neuts % (Manual)   


 


Band Neutrophils %   


 


Lymphocytes % (Manual)   


 


Monocytes % (Manual)   


 


Eosinophils % (Manual)   


 


Basophils % (Manual)   


 


Abs Neuts (Manual)   


 


Abs Lymphs (Manual)   


 


Abs Monocytes (Manual)   


 


Absolute Eos (Manual)   


 


Abs Basophils (Manual)   


 


Platelet Comment   


 


RBC Morph Comment   


 


Sodium   


 


Potassium   


 


Chloride   


 


Carbon Dioxide   


 


Anion Gap   


 


BUN   


 


Creatinine   


 


Est GFR ( Amer)   


 


Est GFR (MDRD) Non-Af   


 


Glucose   


 


POC Glucose   


 


Hemoglobin A1c %  8.7 H  


 


Lactic Acid   


 


Calcium   


 


Total Bilirubin   


 


Direct Bilirubin   


 


Neonat Total Bilirubin   


 


Neonat Direct Bilirubin   


 


Neonat Indirect Bili   


 


AST   


 


ALT   


 


Alkaline Phosphatase   


 


Creatine Kinase   34 


 


CK-MB (CK-2)    0.36


 


Troponin I    Cancelled


 


Total Protein   


 


Albumin   


 


Triglycerides   


 


Cholesterol   


 


LDL Cholesterol Direct   


 


VLDL Cholesterol   


 


HDL Cholesterol   














  09/16/19 09/16/19 09/16/19





  06:08 10:34 10:34


 


WBC   


 


RBC   


 


Hgb   


 


Hct   


 


MCV   


 


MCH   


 


MCHC   


 


RDW   


 


Plt Count   


 


Lymph % (Auto)   


 


Mono % (Auto)   


 


Eos % (Auto)   


 


Baso % (Auto)   


 


Absolute Neuts (auto)   


 


Absolute Lymphs (auto)   


 


Absolute Monos (auto)   


 


Absolute Eos (auto)   


 


Absolute Basos (auto)   


 


Total Counted   


 


Seg Neutrophils %   


 


Seg Neuts % (Manual)   


 


Band Neutrophils %   


 


Lymphocytes % (Manual)   


 


Monocytes % (Manual)   


 


Eosinophils % (Manual)   


 


Basophils % (Manual)   


 


Abs Neuts (Manual)   


 


Abs Lymphs (Manual)   


 


Abs Monocytes (Manual)   


 


Absolute Eos (Manual)   


 


Abs Basophils (Manual)   


 


Platelet Comment   


 


RBC Morph Comment   


 


Sodium   


 


Potassium   


 


Chloride   


 


Carbon Dioxide   


 


Anion Gap   


 


BUN   


 


Creatinine   


 


Est GFR ( Amer)   


 


Est GFR (MDRD) Non-Af   


 


Glucose   


 


POC Glucose  220 H  


 


Hemoglobin A1c %   


 


Lactic Acid   


 


Calcium   


 


Total Bilirubin   


 


Direct Bilirubin   


 


Neonat Total Bilirubin   


 


Neonat Direct Bilirubin   


 


Neonat Indirect Bili   


 


AST   


 


ALT   


 


Alkaline Phosphatase   


 


Creatine Kinase   34 


 


CK-MB (CK-2)    0.41


 


Troponin I    0.120


 


Total Protein   


 


Albumin   


 


Triglycerides   


 


Cholesterol   


 


LDL Cholesterol Direct   


 


VLDL Cholesterol   


 


HDL Cholesterol   














  09/16/19 09/16/19 09/16/19





  10:34 11:33 15:56


 


WBC   


 


RBC   


 


Hgb   


 


Hct   


 


MCV   


 


MCH   


 


MCHC   


 


RDW   


 


Plt Count   


 


Lymph % (Auto)   


 


Mono % (Auto)   


 


Eos % (Auto)   


 


Baso % (Auto)   


 


Absolute Neuts (auto)   


 


Absolute Lymphs (auto)   


 


Absolute Monos (auto)   


 


Absolute Eos (auto)   


 


Absolute Basos (auto)   


 


Total Counted   


 


Seg Neutrophils %   


 


Seg Neuts % (Manual)   


 


Band Neutrophils %   


 


Lymphocytes % (Manual)   


 


Monocytes % (Manual)   


 


Eosinophils % (Manual)   


 


Basophils % (Manual)   


 


Abs Neuts (Manual)   


 


Abs Lymphs (Manual)   


 


Abs Monocytes (Manual)   


 


Absolute Eos (Manual)   


 


Abs Basophils (Manual)   


 


Platelet Comment   


 


RBC Morph Comment   


 


Sodium   


 


Potassium   


 


Chloride   


 


Carbon Dioxide   


 


Anion Gap   


 


BUN   


 


Creatinine   


 


Est GFR ( Amer)   


 


Est GFR (MDRD) Non-Af   


 


Glucose   


 


POC Glucose   208 H  183 H


 


Hemoglobin A1c %   


 


Lactic Acid  1.1  


 


Calcium   


 


Total Bilirubin   


 


Direct Bilirubin   


 


Neonat Total Bilirubin   


 


Neonat Direct Bilirubin   


 


Neonat Indirect Bili   


 


AST   


 


ALT   


 


Alkaline Phosphatase   


 


Creatine Kinase   


 


CK-MB (CK-2)   


 


Troponin I   


 


Total Protein   


 


Albumin   


 


Triglycerides   


 


Cholesterol   


 


LDL Cholesterol Direct   


 


VLDL Cholesterol   


 


HDL Cholesterol   














  09/16/19 09/16/19 09/16/19





  17:45 17:45 21:27


 


WBC   


 


RBC   


 


Hgb   


 


Hct   


 


MCV   


 


MCH   


 


MCHC   


 


RDW   


 


Plt Count   


 


Lymph % (Auto)   


 


Mono % (Auto)   


 


Eos % (Auto)   


 


Baso % (Auto)   


 


Absolute Neuts (auto)   


 


Absolute Lymphs (auto)   


 


Absolute Monos (auto)   


 


Absolute Eos (auto)   


 


Absolute Basos (auto)   


 


Total Counted   


 


Seg Neutrophils %   


 


Seg Neuts % (Manual)   


 


Band Neutrophils %   


 


Lymphocytes % (Manual)   


 


Monocytes % (Manual)   


 


Eosinophils % (Manual)   


 


Basophils % (Manual)   


 


Abs Neuts (Manual)   


 


Abs Lymphs (Manual)   


 


Abs Monocytes (Manual)   


 


Absolute Eos (Manual)   


 


Abs Basophils (Manual)   


 


Platelet Comment   


 


RBC Morph Comment   


 


Sodium   


 


Potassium   


 


Chloride   


 


Carbon Dioxide   


 


Anion Gap   


 


BUN   


 


Creatinine   


 


Est GFR ( Amer)   


 


Est GFR (MDRD) Non-Af   


 


Glucose   


 


POC Glucose    192 H


 


Hemoglobin A1c %   


 


Lactic Acid   


 


Calcium   


 


Total Bilirubin   


 


Direct Bilirubin   


 


Neonat Total Bilirubin   


 


Neonat Direct Bilirubin   


 


Neonat Indirect Bili   


 


AST   


 


ALT   


 


Alkaline Phosphatase   


 


Creatine Kinase  34  


 


CK-MB (CK-2)   0.30 


 


Troponin I   0.091 


 


Total Protein   


 


Albumin   


 


Triglycerides   


 


Cholesterol   


 


LDL Cholesterol Direct   


 


VLDL Cholesterol   


 


HDL Cholesterol   








                                        





Brain MRI with MRA  09/16/19 00:00


IMPRESSION:  NO ENHANCING LESIONS. MINIMAL MICROVASCULAR ISCHEMIC CHANGE. 

OTHERWISE NORMAL STUDY.


EVIDENCE OF ACUTE STROKE: NO.


 








Chest X-Ray  09/16/19 00:00


IMPRESSION:  NO ACUTE RADIOGRAPHIC FINDING IN THE CHEST.


 








Head MRI  09/16/19 00:00


IMPRESSION:  NO ENHANCING LESIONS. MINIMAL MICROVASCULAR ISCHEMIC CHANGE. 

OTHERWISE NORMAL STUDY.


EVIDENCE OF ACUTE STROKE: NO.


 


IMPRESSION/RECOMMENDATION:


1.  Elevated troponin I.  This is now trending down.  The patient is EKG is 

normal.  This is most likely secondary to supply demand mismatch due to severe 

dehydration.  There is no evidence of non-ST elevation MI.  But the patient does

 have multiple CAD risk factors.  Hence would recommend getting IV Lexiscan 

Cardiolite stress test as an outpatient.


2.  Severe dehydration.:  Secondary to nausea and vomiting and dizziness.  Agree

 with hydration.


3.  Nausea vomiting dizziness.?  Etiology


4.  Diabetes mellitus.  Continue aggressive control of blood sugars as it is 

being done.


5.  Hypertension: Blood pressure seems to be well controlled.


6.  Systolic murmur: Later would recommend getting an echocardiogram as an 

outpatient.


7.  History of chronic leukocytosis:??  Etiology 


8.  History of tobacco abuse: Patient given tobacco cessation counseling.  5 

minutes spent on this patient including treatment options with medications and 

counseling and behavioral therapy.





Medications reviewed.  Management plan discussed with attending physician on the

 case.  60 minutes spent on this patient with more than 50% of time spent in 

direct patient care.  Medical decision making is of moderate to high complexity.

  Will follow.

## 2019-09-16 NOTE — PDOC H&P
History of Present Illness


Admission Date/PCP: 


  09/15/19 14:49





  ARIAS BOLIVAR MD





Patient complains of: Dizziness


History of Present Illness: 


WALLY ARMENDARIZ is a 48 year old female


Is a 48-year-old female has a history of the type 2 diabetes currently see the 

endocrinologist history of the hypertension's hyperlipidemia chronic kidney 

disease history of the chronic leukocytosis negative flow cytometry seen by the 

hematologist last year came to the emergency department with a complaint of 

dizziness started 24 hours before the came


Patient describing the feel very imbalance when she moves her head patients 

denied any visual problems denied any weakness


Patient denied any cough no congestions no chest pain


Does feel like a sweaty but no fever no chills


Denied any tick bites


Denied any contact with the sick persons


In the emergency department patient initial work-up was suggesting hyperglycemia

and dehydration's and patient started receiving the IV fluid


When I saw the patient on the floor patient was feeling okay except still have 

ongoing dizziness when the patient's move the head which related to the mostly 

vertigo type symptoms but no other neurological symptoms


Patient's troponin was elevated and discussed with the cardiology Dr. Lee

order some testing to further evaluate unlikely to be an acute myocardial 

infection is most likely mismatch troponin elevated


Patient's white count was 22 but patients denied any fever denied any abdominal 

pain no nausea no vomiting anymore was complaining initially no loose stool 

anymore


Patient seen by the hematologist in the past with persistent elevated white 

count with the flow cytometry was negative








Past Medical History


Cardiac Medical History: Reports: Hyperlipidema, Hypertension - meds less than 

one yr-well controlled


   Denies: Coronary Artery Disease, Myocardial Infarction


Pulmonary Medical History: Reports: Pneumonia - 2008


   Denies: Asthma, Bronchitis, Chronic Obstructive Pulmonary Disease (COPD)


Neurological Medical History: 


   Denies: Seizures


Endocrine Medical History: Reports: Diabetes Mellitus Type 2


Renal/ Medical History: Reports: Chronic Kidney Disease


GI Medical History: Reports: Gastroesophageal Reflux Disease


Musculoskeltal Medical History: 


   Denies: Arthritis


Hematology: 


   Denies: Anemia





Past Surgical History


Past Surgical History: Reports: Cholecystectomy, Orthopedic Surgery - right 

rotater cuff


   Denies: Pacemaker





Social History


Smoking Status: Current Every Day Smoker


Frequency of Alcohol Use: None


Hx Recreational Drug Use: No


Drugs: None


Hx Prescription Drug Abuse: No





- Advance Directive


Resuscitation Status: Full Code





Family History


Family History: None, DM, Hypertension


Parental Family History Reviewed: Yes


Children Family History Reviewed: Yes


Sibling(s) Family History Reviewed.: Yes





Medication/Allergy


Home Medications: 








Amlodipine Besylate [Norvasc 10 mg Tablet] 10 mg PO DAILY 09/15/19 


Aspirin [Ecotrin 81 mg EC Tablet] 81 mg PO DAILY 09/15/19 


Insulin Detemir [Levemir] 19 unit SQ QHS 09/15/19 


Semaglutide [Ozempic] 1 mg SQ MO@2200 09/15/19 








Allergies/Adverse Reactions: 


                                        





hydrocodone bitartrate [From Vicodin] Allergy (Severe, Verified 09/15/19 08:51)


   Rash, SOB; N&V, Muscle weakness, cold sweats, paleness,











Review of Systems


Constitutional: PRESENT: night sweats.  ABSENT: chills, fever(s), headache(s), 

weight gain, weight loss


Eyes: ABSENT: visual disturbances


Ears: ABSENT: hearing changes


Cardiovascular: ABSENT: chest pain, dyspnea on exertion, edema, orthropnea, 

palpitations


Respiratory: ABSENT: cough, hemoptysis


Gastrointestinal: PRESENT: nausea.  ABSENT: abdominal pain, constipation, 

diarrhea, hematemesis, hematochezia, vomiting


Genitourinary: ABSENT: dysuria, hematuria


Musculoskeletal: ABSENT: joint swelling


Integumentary: ABSENT: rash, wounds


Neurological: PRESENT: abnormal gait, dizziness.  ABSENT: abnormal speech, 

confusion, focal weakness, syncope


Psychiatric: ABSENT: anxiety, depression, homidical ideation, suicidal ideation


Endocrine: ABSENT: cold intolerance, heat intolerance, menstrual abnormalities, 

polydipsia, polyuria


Hematologic/Lymphatic: ABSENT: easy bleeding, easy bruising, lymphadenopathy





Physical Exam


Vital Signs: 


                                        











Temp Pulse Resp BP Pulse Ox


 


 98.2 F   89   19   156/67 H  91 L


 


 09/16/19 07:42  09/16/19 07:42  09/16/19 07:42  09/16/19 07:42  09/16/19 07:42








                                 Intake & Output











 09/15/19 09/16/19 09/17/19





 06:59 06:59 06:59


 


Intake Total  3240 


 


Output Total  400 


 


Balance  2840 


 


Weight  89.1 kg 











General appearance: PRESENT: no acute distress, well-developed, well-nourished


Head exam: PRESENT: atraumatic, normocephalic


Eye exam: PRESENT: conjunctiva pink, EOMI, PERRLA.  ABSENT: scleral icterus


Ear exam: PRESENT: normal external ear exam


Mouth exam: PRESENT: moist, tongue midline


Neck exam: PRESENT: full ROM.  ABSENT: carotid bruit, JVD, lymphadenopathy, 

thyromegaly


Respiratory exam: PRESENT: clear to auscultation aysha


Cardiovascular exam: PRESENT: RRR.  ABSENT: diastolic murmur, rubs, systolic 

murmur


Pulses: PRESENT: normal dorsalis pedis pul, +2 pedal pulses bilateral


Vascular exam: PRESENT: normal capillary refill


GI/Abdominal exam: PRESENT: normal bowel sounds, soft.  ABSENT: distended, 

guarding, mass, organolmegaly, rebound, tenderness


Rectal exam: PRESENT: deferred


Extremities exam: ABSENT: pedal edema


Neurological exam: PRESENT: alert, awake, oriented to person, oriented to place,

oriented to time, oriented to situation, reflexes normal, abnormal gait, CN II-

XII grossly intact.  ABSENT: motor sensory deficit


Psychiatric exam: PRESENT: appropriate affect, normal mood.  ABSENT: homicidal 

ideation, suicidal ideation


Skin exam: PRESENT: dry, intact, warm.  ABSENT: cyanosis, rash





Results


Laboratory Results: 


                                        





                                 09/16/19 05:18 





                                 09/16/19 05:18 





                                        











  09/15/19 09/15/19 09/15/19





  09:40 11:40 12:34


 


WBC   


 


RBC   


 


Hgb   


 


Hct   


 


MCV   


 


MCH   


 


MCHC   


 


RDW   


 


Plt Count   


 


Seg Neutrophils %   


 


Sodium    136.5 L


 


Potassium    3.7


 


Chloride    98


 


Carbon Dioxide    23


 


Anion Gap    16


 


BUN    14


 


Creatinine    0.83


 


Est GFR ( Amer)    > 60


 


Glucose    345 H


 


Calcium    9.0


 


Magnesium  1.3 L  


 


Total Bilirubin   


 


AST   


 


Alkaline Phosphatase   


 


Total Protein   


 


Albumin   


 


Triglycerides   


 


Cholesterol   


 


LDL Cholesterol Direct   


 


VLDL Cholesterol   


 


HDL Cholesterol   


 


Amylase  46  


 


Lipase  Cancelled  


 


Urine Color   STRAW 


 


Urine Appearance   CLEAR 


 


Urine pH   6.0 


 


Ur Specific Gravity   1.024 


 


Urine Protein   30 H 


 


Urine Glucose (UA)   >=1000 H 


 


Urine Ketones   100 H 


 


Urine Blood   NEGATIVE 


 


Urine Nitrite   NEGATIVE 


 


Ur Leukocyte Esterase   NEGATIVE 


 


Urine WBC (Auto)   2 


 


Urine RBC (Auto)   1 














  09/15/19 09/16/19 09/16/19





  16:40 05:18 05:18


 


WBC   22.1 H 


 


RBC   4.43 


 


Hgb   12.6 


 


Hct   36.6 


 


MCV   83 


 


MCH   28.4 


 


MCHC   34.4 


 


RDW   13.6 


 


Plt Count   397 


 


Seg Neutrophils %   Not Reportable 


 


Sodium    135.6 L


 


Potassium    3.7


 


Chloride    100


 


Carbon Dioxide    27


 


Anion Gap    9


 


BUN    12


 


Creatinine    0.84


 


Est GFR ( Amer)    > 60


 


Glucose    236 H


 


Calcium    8.7


 


Magnesium   


 


Total Bilirubin    0.5


 


AST    14


 


Alkaline Phosphatase    54


 


Total Protein    6.1 L


 


Albumin    3.3 L


 


Triglycerides    126


 


Cholesterol    75.22


 


LDL Cholesterol Direct    43


 


VLDL Cholesterol    25.0


 


HDL Cholesterol    32 L


 


Amylase   


 


Lipase  150.9  


 


Urine Color   


 


Urine Appearance   


 


Urine pH   


 


Ur Specific Gravity   


 


Urine Protein   


 


Urine Glucose (UA)   


 


Urine Ketones   


 


Urine Blood   


 


Urine Nitrite   


 


Ur Leukocyte Esterase   


 


Urine WBC (Auto)   


 


Urine RBC (Auto)   








                                        





09/15/19 11:40   Clean Catch Midstream   Urine Culture - Final


                            Mixed Urogenital Tess





                                        











  09/15/19 09/15/19 09/16/19





  16:40 21:45 05:18


 


Creatine Kinase   


 


CK-MB (CK-2)   


 


Troponin I  0.051  0.138  0.142














  09/16/19 09/16/19





  05:18 05:18


 


Creatine Kinase  34 


 


CK-MB (CK-2)   0.36


 


Troponin I   Cancelled














Assessment & Plan





- Diagnosis


(1) Dizziness


Is this a current diagnosis for this admission?: Yes   


Plan: 


We will get the MRI and MRA of the head


Most likely due to the elevated blood sugars but with the multiple other 

etiology will rule out the other neurological Disorder











(2) Leukocytosis


Qualifiers: 


   Leukocytosis type: unspecified   Qualified Code(s): D72.829 - Elevated white 

blood cell count, unspecified   


Is this a current diagnosis for this admission?: Yes   


Plan: 


Patient with persistent elevated white count currently more elevated underlying 

stress may be underlying infections will get the cultures out of the lactic 

acids


~Hematologist with the patient is complaining of a sweating episodes to rule out

underlying any leukemia








(3) Type 2 diabetes mellitus


Qualifiers: 


   Diabetes mellitus long term insulin use: with long term use   Chronic kidney 

disease stage: stage 2 (mild) 


Is this a current diagnosis for this admission?: Yes   


Plan: 


Continues to sliding scale








(4) Hypertension


Qualifiers: 


   Hypertension type: essential hypertension   Qualified Code(s): I10 - Essenti

al (primary) hypertension   


Is this a current diagnosis for this admission?: Yes   


Plan: 


Continues to current medications








(5) Hyperlipidemia


Qualifiers: 


   Hyperlipidemia type: unspecified   Qualified Code(s): E78.5 - Hyperlipidemia,

unspecified   


Is this a current diagnosis for this admission?: Yes   





(6) Elevated troponin


Is this a current diagnosis for this admission?: Yes   


Plan: 


As per discussed with the cardiology most likely a mismatch


He ordered a troponin T








(7) Positional vertigo


Is this a current diagnosis for this admission?: Yes   


Plan: 


Once the MRI is negative we will start the patient on the meclizine and physical

therapy evaluations








(8) Dehydration


Is this a current diagnosis for this admission?: Yes   


Plan: 


Continues to IV fluid








(9) Hyperglycemia due to type 2 diabetes mellitus


Qualifiers: 


   Diabetes mellitus long term insulin use: with long term use   Qualified 

Code(s): E11.65 - Type 2 diabetes mellitus with hyperglycemia; Z79.4 - Long term

(current) use of insulin   


Is this a current diagnosis for this admission?: Yes   


Plan: 


Continues a sliding scale








(10) Vomiting


Qualifiers: 


   Vomiting type: unspecified   Vomiting Intractability: unspecified   Nausea 

presence: with nausea   Qualified Code(s): R11.2 - Nausea with vomiting, 

unspecified   


Is this a current diagnosis for this admission?: Yes   


Plan: 


Will continues to Protonix


Walker all stable








- Time


Time Spent: 50 to 70 Minutes


Medications reviewed and adjusted accordingly: Yes


Anticipated discharge: Home


Within: Other





- Inpatient Certification


Based on my medical assessment, after consideration of the patient's 

comorbidities, presenting symptoms, or acuity I expect that the services needed 

warrant INPATIENT care.: Yes


I certify that my determination is in accordance with my understanding of 

Medicare's requirements for reasonable and necessary INPATIENT services [42 CFR 

412.3e].: Yes


Medical Necessity: Significant Comorbidiites Make Outpatient Treatment Too Ri

katina, Need Close Monitoring Due to Risk of Patient Decompensation, Need For IV 

Fluids


Post Hospital Care: D/C Planner Documentation





- Plan Summary


Plan Summary: 





See other MD orders

## 2019-09-17 LAB
ADD MANUAL DIFF: NO
ALBUMIN SERPL-MCNC: 3.2 G/DL (ref 3.5–5)
ALP SERPL-CCNC: 52 U/L (ref 38–126)
ANION GAP SERPL CALC-SCNC: 7 MMOL/L (ref 5–19)
AST SERPL-CCNC: 21 U/L (ref 14–36)
BASOPHILS # BLD AUTO: 0.1 10^3/UL (ref 0–0.2)
BASOPHILS NFR BLD AUTO: 1.1 % (ref 0–2)
BILIRUB DIRECT SERPL-MCNC: 0.1 MG/DL (ref 0–0.4)
BILIRUB SERPL-MCNC: 0.4 MG/DL (ref 0.2–1.3)
BUN SERPL-MCNC: 9 MG/DL (ref 7–20)
CALCIUM: 8.7 MG/DL (ref 8.4–10.2)
CHLORIDE SERPL-SCNC: 101 MMOL/L (ref 98–107)
CO2 SERPL-SCNC: 28 MMOL/L (ref 22–30)
EOSINOPHIL # BLD AUTO: 0.1 10^3/UL (ref 0–0.6)
EOSINOPHIL NFR BLD AUTO: 0.8 % (ref 0–6)
ERYTHROCYTE [DISTWIDTH] IN BLOOD BY AUTOMATED COUNT: 13.2 % (ref 11.5–14)
GLUCOSE SERPL-MCNC: 182 MG/DL (ref 75–110)
HCT VFR BLD CALC: 37.4 % (ref 36–47)
HGB BLD-MCNC: 12.6 G/DL (ref 12–15.5)
LYMPHOCYTES # BLD AUTO: 3.1 10^3/UL (ref 0.5–4.7)
LYMPHOCYTES NFR BLD AUTO: 25.1 % (ref 13–45)
MCH RBC QN AUTO: 28.1 PG (ref 27–33.4)
MCHC RBC AUTO-ENTMCNC: 33.8 G/DL (ref 32–36)
MCV RBC AUTO: 83 FL (ref 80–97)
MONOCYTES # BLD AUTO: 1 10^3/UL (ref 0.1–1.4)
MONOCYTES NFR BLD AUTO: 8.1 % (ref 3–13)
NEUTROPHILS # BLD AUTO: 7.9 10^3/UL (ref 1.7–8.2)
NEUTS SEG NFR BLD AUTO: 64.9 % (ref 42–78)
PLATELET # BLD: 369 10^3/UL (ref 150–450)
POTASSIUM SERPL-SCNC: 4.1 MMOL/L (ref 3.6–5)
PROT SERPL-MCNC: 6 G/DL (ref 6.3–8.2)
RBC # BLD AUTO: 4.5 10^6/UL (ref 3.72–5.28)
TOTAL CELLS COUNTED % (AUTO): 100 %
WBC # BLD AUTO: 12.2 10^3/UL (ref 4–10.5)

## 2019-09-17 RX ADMIN — INSULIN LISPRO SCH UNIT: 100 INJECTION, SOLUTION INTRAVENOUS; SUBCUTANEOUS at 22:39

## 2019-09-17 RX ADMIN — METFORMIN HYDROCHLORIDE SCH MG: 500 TABLET, FILM COATED ORAL at 16:46

## 2019-09-17 RX ADMIN — INSULIN GLARGINE SCH UNIT: 100 INJECTION, SOLUTION SUBCUTANEOUS at 22:40

## 2019-09-17 RX ADMIN — ATORVASTATIN CALCIUM SCH MG: 10 TABLET, FILM COATED ORAL at 22:40

## 2019-09-17 RX ADMIN — PANTOPRAZOLE SODIUM SCH MG: 40 TABLET, DELAYED RELEASE ORAL at 05:20

## 2019-09-17 RX ADMIN — ASPIRIN SCH MG: 81 TABLET, COATED ORAL at 09:10

## 2019-09-17 RX ADMIN — LISINOPRIL SCH MG: 5 TABLET ORAL at 09:11

## 2019-09-17 RX ADMIN — AMLODIPINE BESYLATE SCH MG: 10 TABLET ORAL at 09:10

## 2019-09-17 RX ADMIN — INSULIN LISPRO SCH UNIT: 100 INJECTION, SOLUTION INTRAVENOUS; SUBCUTANEOUS at 07:47

## 2019-09-17 RX ADMIN — METFORMIN HYDROCHLORIDE SCH MG: 500 TABLET, FILM COATED ORAL at 07:47

## 2019-09-17 RX ADMIN — ENOXAPARIN SODIUM SCH: 40 INJECTION SUBCUTANEOUS at 09:14

## 2019-09-17 RX ADMIN — SODIUM CHLORIDE PRN MLS/HR: 9 INJECTION, SOLUTION INTRAVENOUS at 05:21

## 2019-09-17 RX ADMIN — AMLODIPINE BESYLATE SCH: 10 TABLET ORAL at 09:09

## 2019-09-17 RX ADMIN — INSULIN LISPRO SCH UNIT: 100 INJECTION, SOLUTION INTRAVENOUS; SUBCUTANEOUS at 16:45

## 2019-09-17 RX ADMIN — INSULIN LISPRO SCH UNIT: 100 INJECTION, SOLUTION INTRAVENOUS; SUBCUTANEOUS at 12:02

## 2019-09-17 RX ADMIN — EZETIMIBE SCH MG: 10 TABLET ORAL at 09:10

## 2019-09-17 NOTE — PDOC CONSULTATION
Consultation


Consult Date: 09/17/19


Attending physician:: ARIAS BOLIVAR


Provider Consulted: ELDON WOODARD


Consult reason:: Hematology/Oncology consultation was requested for patient with

elevated WBC count.





History of Present Illness


Admission Date/PCP: 


  09/15/19 14:49





  ARIAS BOLIVAR MD





History of Present Illness: 


WALLY ARMENDARIZ is a 48 year old female who was seen in my office about a 

year ago and evaluated at that time for leukocytosis.  ESR was elevated and Flow

cytometry was negative.  However, she was lost to follow-up.  Patient states 

that she was in her usual state of health until she suddenly stool up from a 

chair to go to bed and became very dizzy.  She was able to lie down but awoke 2 

hours later with continued dizziness and severe vomiting.  She vomited several 

times, became very weak, and was brought to the ED.  On admission, she was found

to have elevated WBC count, with a left shift, normal temp, mild tachycardia and

hypertension.  She has received IV fluids overnight and this morning, states 

that she is feeling much better.  She is still a little dizzy, but she was able 

to eat some dinner and hold it down.  








Past Medical History


Cardiac Medical History: Reports: Hyperlipidema, Hypertension - meds less than 

one yr-well controlled


   Denies: Coronary Artery Disease, Myocardial Infarction


Pulmonary Medical History: Reports: Pneumonia - 2008


   Denies: Asthma, Bronchitis, Chronic Obstructive Pulmonary Disease (COPD)


Neurological Medical History: 


   Denies: Seizures


Endocrine Medical History: Reports: Diabetes Mellitus Type 2


Renal/ Medical History: Reports: Chronic Kidney Disease


GI Medical History: Reports: Gastroesophageal Reflux Disease


Musculoskeltal Medical History: 


   Denies: Arthritis


Hematology: 


   Denies: Anemia





Past Surgical History


Past Surgical History: Reports: Cholecystectomy, Orthopedic Surgery - right 

rotater cuff


   Denies: Pacemaker





Social History


Smoking Status: Current Every Day Smoker


Frequency of Alcohol Use: None


Hx Recreational Drug Use: No


Drugs: None


Hx Prescription Drug Abuse: No





- Advance Directive


Resuscitation Status: Full Code





Family History


Family History: None, DM, Hypertension


Parental Family History Reviewed: Yes


Children Family History Reviewed: Yes


Sibling(s) Family History Reviewed.: Yes





Medication/Allergy


Home Medications: 








Amlodipine Besylate [Norvasc 10 mg Tablet] 10 mg PO DAILY 09/16/19 


Ezetimibe [Zetia 10 mg Tablet] 10 mg PO DAILY 09/16/19 


Glipizide [Glucotrol 5 mg Tablet] 5 mg PO TID 09/16/19 


Hydrochlorothiazide [Hydrodiuril 25 mg Tablet] 25 mg PO QAM 09/16/19 


Insulin Detemir [Levemir] 18 units SQ QHS 09/16/19 


Lisinopril [Prinivil 2.5 mg Tablet] 2.5 mg PO DAILY 09/16/19 


Metformin HCl [Metformin HCl ER] 1,000 mg PO BIDBS 09/16/19 


Rosuvastatin Calcium [Crestor 5 mg Tablet] 5 mg PO QHS 09/16/19 


Semaglutide [Ozempic] 1 mg SQ MO@1900 09/16/19 








Allergies/Adverse Reactions: 


                                        





hydrocodone bitartrate [From Vicodin] Allergy (Severe, Verified 09/15/19 08:51)


   Rash, SOB; N&V, Muscle weakness, cold sweats, paleness,











Review of Systems


Constitutional: ABSENT: fever(s), headache(s)


Eyes: ABSENT: visual disturbances


Ears: ABSENT: hearing changes


Nose, Mouth, and Throat: PRESENT: vertigo


Cardiovascular: ABSENT: chest pain


Respiratory: ABSENT: dyspnea


Gastrointestinal: PRESENT: diarrhea, vomiting


Genitourinary: ABSENT: dysuria


Musculoskeletal: ABSENT: back pain


Integumentary: ABSENT: rash


Neurological: PRESENT: weakness


Hematologic/Lymphatic: ABSENT: easy bleeding





Physical Exam


Vital Signs: 


                                        











Temp Pulse Resp BP Pulse Ox


 


 98.0 F   93   16   141/75 H  96 


 


 09/16/19 23:00  09/16/19 23:00  09/16/19 23:00  09/16/19 23:00  09/16/19 23:00








                                 Intake & Output











 09/16/19 09/17/19 09/18/19





 06:59 06:59 06:59


 


Intake Total 3240 3213 275


 


Output Total 400 400 


 


Balance 2840 2813 275


 


Weight 89.1 kg 89.2 kg 











General appearance: PRESENT: no acute distress, well-developed, well-nourished


Exam: 





48 year old  female.  


Head exam: PRESENT: normocephalic


Eye exam: PRESENT: EOMI


Mouth exam: PRESENT: moist, tongue midline


Neck exam: ABSENT: lymphadenopathy, tenderness


Respiratory exam: PRESENT: clear to auscultation aysha, unlabored


Cardiovascular exam: PRESENT: RRR


GI/Abdominal exam: PRESENT: soft.  ABSENT: tenderness


Extremities exam: PRESENT: +1 edema - hands bilaterally..  ABSENT: pedal edema


Musculoskeletal exam: PRESENT: normal inspection


Neurological exam: PRESENT: alert, awake


Psychiatric exam: PRESENT: appropriate affect


Skin exam: PRESENT: normal color





Results


Laboratory Results: 


                                        





                                 09/17/19 04:13 





                                 09/17/19 04:13 





                                        











  09/16/19 09/17/19 09/17/19





  10:34 04:13 04:13


 


WBC   12.2 H 


 


RBC   4.50 


 


Hgb   12.6 


 


Hct   37.4 


 


MCV   83 


 


MCH   28.1 


 


MCHC   33.8 


 


RDW   13.2 


 


Plt Count   369 


 


Seg Neutrophils %   64.9 


 


Sodium    136.0 L


 


Potassium    4.1


 


Chloride    101


 


Carbon Dioxide    28


 


Anion Gap    7


 


BUN    9


 


Creatinine    0.78


 


Est GFR ( Amer)    > 60


 


Glucose    182 H


 


Lactic Acid  1.1  


 


Calcium    8.7


 


Total Bilirubin    0.4


 


AST    21


 


Alkaline Phosphatase    52


 


Total Protein    6.0 L


 


Albumin    3.2 L








                                        





09/15/19 11:40   Clean Catch Midstream   Urine Culture - Final


                            Mixed Urogenital Tess





                                        











  09/15/19 09/15/19 09/16/19





  16:40 21:45 05:18


 


Creatine Kinase   


 


CK-MB (CK-2)   


 


Troponin I  0.051  0.138  0.142














  09/16/19 09/16/19 09/16/19





  05:18 05:18 10:34


 


Creatine Kinase  34   34


 


CK-MB (CK-2)   0.36 


 


Troponin I   Cancelled 














  09/16/19 09/16/19 09/16/19





  10:34 17:45 17:45


 


Creatine Kinase   34 


 


CK-MB (CK-2)  0.41   0.30


 


Troponin I  0.120   0.091











Impressions: 


                                        





Brain MRI with MRA  09/16/19 00:00


IMPRESSION:  NO ENHANCING LESIONS. MINIMAL MICROVASCULAR ISCHEMIC CHANGE. OTHER

WISE NORMAL STUDY.


EVIDENCE OF ACUTE STROKE: NO.


 








Chest X-Ray  09/16/19 00:00


IMPRESSION:  NO ACUTE RADIOGRAPHIC FINDING IN THE CHEST.


 








Head MRI  09/16/19 00:00


IMPRESSION:  NO ENHANCING LESIONS. MINIMAL MICROVASCULAR ISCHEMIC CHANGE. 

OTHERWISE NORMAL STUDY.


EVIDENCE OF ACUTE STROKE: NO.


 











Status: Image reviewed by me





Assessment & Plan





- Diagnosis


(1) Dehydration


Is this a current diagnosis for this admission?: Yes   


Plan: 


Now resolved.  She is eating and drinking better.  IV fluids have been stopped. 










(2) Dizziness


Is this a current diagnosis for this admission?: Yes   





(3) Leukocytosis


Qualifiers: 


   Leukocytosis type: unspecified   Qualified Code(s): D72.829 - Elevated white 

blood cell count, unspecified   


Is this a current diagnosis for this admission?: Yes   


Plan: 


Her WBC count is much improved today.  This could have been a mixture of 

leukemoid reaction due to acute infection as well as concentration due to 

dehydration after vomiting.  Her Flow Cytometry last year was normal.  I will be

happy to see her again in the office in about 2-4 weeks and repeat CBC at that 

time.  If further work-up is indicated, this may be performed as outpatient.  








(4) Vomiting


Qualifiers: 


   Vomiting type: unspecified   Vomiting Intractability: unspecified   Nausea 

presence: with nausea   Qualified Code(s): R11.2 - Nausea with vomiting, 

unspecified   


Is this a current diagnosis for this admission?: Yes   


Plan: 


Her symptoms suggest an acute viral illness.  Her head MRI was normal.  Cardiac 

enzymes were negative.  Her symptoms have greatly improved.  I agree with 

current management.  








- Plan Summary


Plan Summary: 





Patient was discussed with Dr. Bolivar.  I will be happy to follow her with you 

and see her as outpatient on discharge.

## 2019-09-17 NOTE — PROGRESS NOTE
Provider Note


Provider Note: 





CARDIOLOGY PROGRESS NOTE by Dr. Karina Rees on 9/17/2019.





SUBJECTIVE: The patient denies any chest pain or discomfort.  There is no PND 

orthopnea.  There is no palpitations.  The patient still dizziness/vertigo 

symptoms have improved with medication.  There is no further nausea vomiting.  

The patient has been seen by oncology/hematology, and they have recommended the 

patient follow-up in the office.  There is no TIA CVA symptoms.  Her blood sugar

is improved.





PHYSICAL EXAMINATION: The patient is moderately obese.  In no acute distress.  

She is well-groomed


                                                                Selected Entries











  09/17/19





  07:48


 


Temperature 98.7 F


 


Temperature Oral





Source 


 


Pulse Rate 88


 


Respiratory 16





Rate 


 


Blood Pressure 155/86 H


 


Blood Pressure 109





Mean 


 


BP Location Right Arm


 


BP Position Sitting


 


O2 Sat by Pulse 97





Oximetry 


 


Oxygen Delivery Room Air





Method 





HEAD: Is atraumatic normocephalic.  EYES: Pupils are equal round regular regular

reactive to light and accommodation.  Extraocular movements are normal.  There 

is no conjunctival pallor.  There is no scleral icterus.  Mouth mucous memories 

are dry the tongue is very dry.  The patient appears to be high dehydrated.  The

rest of the ENT is negative.  NECK: Is supple.  There is no JVD.  Carotids are 

equal there is no bruit.  There is no goiter.  There is no lymphadenopathy.  

There is no accessory muscles of respiration use.  Trachea central.  LUNGS: Is 

clear to auscultation percussion without any rhonchi rales wheezing.  HEART: S1-

S2 is heard.  There is no S3 gallop.  There is no S4 gallop.  There is systolic 

murmur the left sternal border and the apex there is no rub.  ABDOMEN: Is soft. 

Open obese.  Nontender.  There is no hepatospleno megaly.  Bowel sounds are well

heard.  EXTREMITIES: Femorals are well felt.  There is no femoral bruits.  Leg 

pulses are well felt there is no pedal edema.  There is no DVT or cellulitis.  

There is no calf tenderness.  CNS: The patient is conscious awake alert oriented

x3 with no focal deficits.  PSYCHIATRIC: The patient judgment insight intact her

affect is normal


                              Labs- All tests 24 hr











  09/16/19 09/17/19 09/17/19





  21:27 04:13 04:13


 


WBC   12.2 H 


 


RBC   4.50 


 


Hgb   12.6 


 


Hct   37.4 


 


MCV   83 


 


MCH   28.1 


 


MCHC   33.8 


 


RDW   13.2 


 


Plt Count   369 


 


Lymph % (Auto)   25.1 


 


Mono % (Auto)   8.1 


 


Eos % (Auto)   0.8 


 


Baso % (Auto)   1.1 


 


Absolute Neuts (auto)   7.9 


 


Absolute Lymphs (auto)   3.1 


 


Absolute Monos (auto)   1.0 


 


Absolute Eos (auto)   0.1 


 


Absolute Basos (auto)   0.1 


 


Seg Neutrophils %   64.9 


 


Sodium    136.0 L


 


Potassium    4.1


 


Chloride    101


 


Carbon Dioxide    28


 


Anion Gap    7


 


BUN    9


 


Creatinine    0.78


 


Est GFR ( Amer)    > 60


 


Est GFR (MDRD) Non-Af    > 60


 


Glucose    182 H


 


POC Glucose  192 H  


 


Calcium    8.7


 


Total Bilirubin    0.4


 


Direct Bilirubin    0.1


 


Neonat Total Bilirubin    Not Reportable


 


Neonat Direct Bilirubin    Not Reportable


 


Neonat Indirect Bili    Not Reportable


 


AST    21


 


ALT    22


 


Alkaline Phosphatase    52


 


Total Protein    6.0 L


 


Albumin    3.2 L














  09/17/19 09/17/19 09/17/19





  06:07 11:58 16:23


 


WBC   


 


RBC   


 


Hgb   


 


Hct   


 


MCV   


 


MCH   


 


MCHC   


 


RDW   


 


Plt Count   


 


Lymph % (Auto)   


 


Mono % (Auto)   


 


Eos % (Auto)   


 


Baso % (Auto)   


 


Absolute Neuts (auto)   


 


Absolute Lymphs (auto)   


 


Absolute Monos (auto)   


 


Absolute Eos (auto)   


 


Absolute Basos (auto)   


 


Seg Neutrophils %   


 


Sodium   


 


Potassium   


 


Chloride   


 


Carbon Dioxide   


 


Anion Gap   


 


BUN   


 


Creatinine   


 


Est GFR ( Amer)   


 


Est GFR (MDRD) Non-Af   


 


Glucose   


 


POC Glucose  191 H  185 H  215 H


 


Calcium   


 


Total Bilirubin   


 


Direct Bilirubin   


 


Neonat Total Bilirubin   


 


Neonat Direct Bilirubin   


 


Neonat Indirect Bili   


 


AST   


 


ALT   


 


Alkaline Phosphatase   


 


Total Protein   


 


Albumin   








                                        





Brain MRI with MRA  09/16/19 00:00


IMPRESSION:  NO ENHANCING LESIONS. MINIMAL MICROVASCULAR ISCHEMIC CHANGE. 

OTHERWISE NORMAL STUDY.


EVIDENCE OF ACUTE STROKE: NO.


 








Chest X-Ray  09/16/19 00:00


IMPRESSION:  NO ACUTE RADIOGRAPHIC FINDING IN THE CHEST.


 








Head MRI  09/16/19 00:00


IMPRESSION:  NO ENHANCING LESIONS. MINIMAL MICROVASCULAR ISCHEMIC CHANGE. 

OTHERWISE NORMAL STUDY.


EVIDENCE OF ACUTE STROKE: NO.








IMPRESSION/RECOMMENDATION:


1.  Elevated troponin I.  This is now trending down.  The patient is EKG is 

normal.  This is most likely secondary to supply demand mismatch due to severe 

dehydration.  There is no evidence of non-ST elevation MI.  Await cardiac 

troponin T levels and heterophil antibody levels.  But the patient does have 

multiple CAD risk factors.  Hence would recommend getting IV Lexiscan Cardiolite

stress test as an outpatient.


2.  Severe dehydration.:  Secondary to nausea and vomiting and dizziness.  Agree

with hydration.


3.  Nausea vomiting dizziness.?  Etiology note that the dizziness may be helped 

with Antivert.  Her nausea and vomiting of resolved.


4.  Diabetes mellitus.  Continue aggressive control of blood sugars as it is 

being done.


5.  Hypertension: Blood pressure seems to be well controlled.


6.  Systolic murmur: Later would recommend getting an echocardiogram as an 

outpatient.


7.  History of chronic leukocytosis:??  Oncology seeing patient.


8.  History of tobacco abuse: Patient given tobacco cessation counseling.  5 

minutes spent on this patient including treatment options with medications and 

counseling and behavioral therapy.


Medications reviewed.  Management plan discussed with Dr. Heredia.  Cardiac status

is stable.  Would recommend the patient have an outpatient echo and a stress 

test.  Cardiac status stable and hence will sign off.  Will follow the patient 

in the office.  Contact information given to the patient.

## 2019-09-17 NOTE — PDOC PROGRESS REPORT
Subjective


Progress Note for:: 09/17/19


Subjective:: 





Patient is feeling much better


The vertigo symptoms is also improving


Patient's white count is coming down seen by the hematologist and suggest to 

follow outpatients for further evaluations


Patient MRI and MRA is also negative for any acute findings


Since chest x-ray is also stable


She is denied any chest pain to than any shortness of the breath


No abdominal pain no nausea no vomiting today





Reason For Visit: 


T2DM POORLY CONTROLLED,VOMITING








Physical Exam


Vital Signs: 


                                        











Temp Pulse Resp BP Pulse Ox


 


 98.7 F   88   16   155/86 H  97 


 


 09/17/19 07:48  09/17/19 07:48  09/17/19 07:48  09/17/19 07:48  09/17/19 07:48








                                 Intake & Output











 09/16/19 09/17/19 09/18/19





 06:59 06:59 06:59


 


Intake Total 3240 3213 275


 


Output Total 400 400 


 


Balance 2840 2813 275


 


Weight 89.1 kg 89.2 kg 











General appearance: PRESENT: no acute distress, well-developed, well-nourished


Head exam: PRESENT: atraumatic, normocephalic


Eye exam: PRESENT: conjunctiva pink, EOMI, PERRLA.  ABSENT: scleral icterus


Ear exam: PRESENT: normal external ear exam


Mouth exam: PRESENT: moist, tongue midline


Neck exam: PRESENT: full ROM.  ABSENT: carotid bruit, JVD, lymphadenopathy, 

thyromegaly


Respiratory exam: PRESENT: clear to auscultation aysha


Cardiovascular exam: PRESENT: RRR.  ABSENT: diastolic murmur, rubs, systolic 

murmur


Pulses: PRESENT: normal dorsalis pedis pul, +2 pedal pulses bilateral


Vascular exam: PRESENT: normal capillary refill


GI/Abdominal exam: PRESENT: normal bowel sounds, soft.  ABSENT: distended, 

guarding, mass, organolmegaly, rebound, tenderness


Rectal exam: PRESENT: deferred


Musculoskeletal exam: PRESENT: ambulatory


Neurological exam: PRESENT: alert, awake, oriented to person, oriented to place,

oriented to time, oriented to situation, CN II-XII grossly intact.  ABSENT: 

motor sensory deficit


Psychiatric exam: PRESENT: appropriate affect, normal mood.  ABSENT: homicidal 

ideation, suicidal ideation


Skin exam: PRESENT: dry, intact, warm.  ABSENT: cyanosis, rash





Results


Laboratory Results: 


                                        





                                 09/17/19 04:13 





                                 09/17/19 04:13 





                                        











  09/16/19 09/17/19 09/17/19





  10:34 04:13 04:13


 


WBC   12.2 H 


 


RBC   4.50 


 


Hgb   12.6 


 


Hct   37.4 


 


MCV   83 


 


MCH   28.1 


 


MCHC   33.8 


 


RDW   13.2 


 


Plt Count   369 


 


Seg Neutrophils %   64.9 


 


Sodium    136.0 L


 


Potassium    4.1


 


Chloride    101


 


Carbon Dioxide    28


 


Anion Gap    7


 


BUN    9


 


Creatinine    0.78


 


Est GFR ( Amer)    > 60


 


Glucose    182 H


 


Lactic Acid  1.1  


 


Calcium    8.7


 


Total Bilirubin    0.4


 


AST    21


 


Alkaline Phosphatase    52


 


Total Protein    6.0 L


 


Albumin    3.2 L








                                        





09/15/19 11:40   Clean Catch Midstream   Urine Culture - Final


                            Mixed Urogenital Tess





                                        











  09/15/19 09/15/19 09/16/19





  16:40 21:45 05:18


 


Creatine Kinase   


 


CK-MB (CK-2)   


 


Troponin I  0.051  0.138  0.142














  09/16/19 09/16/19 09/16/19





  05:18 05:18 10:34


 


Creatine Kinase  34   34


 


CK-MB (CK-2)   0.36 


 


Troponin I   Cancelled 














  09/16/19 09/16/19 09/16/19





  10:34 17:45 17:45


 


Creatine Kinase   34 


 


CK-MB (CK-2)  0.41   0.30


 


Troponin I  0.120   0.091











Impressions: 


                                        





Brain MRI with MRA  09/16/19 00:00


IMPRESSION:  NO ENHANCING LESIONS. MINIMAL MICROVASCULAR ISCHEMIC CHANGE. 

OTHERWISE NORMAL STUDY.


EVIDENCE OF ACUTE STROKE: NO.


 








Chest X-Ray  09/16/19 00:00


IMPRESSION:  NO ACUTE RADIOGRAPHIC FINDING IN THE CHEST.


 








Head MRI  09/16/19 00:00


IMPRESSION:  NO ENHANCING LESIONS. MINIMAL MICROVASCULAR ISCHEMIC CHANGE. 

OTHERWISE NORMAL STUDY.


EVIDENCE OF ACUTE STROKE: NO.


 














Assessment & Plan





- Diagnosis


(1) Dizziness


Is this a current diagnosis for this admission?: Yes   


Plan: 


Most likely related to the vertigo currently all improving continues to physical

therapy continues to meclizine








(2) Leukocytosis


Qualifiers: 


   Leukocytosis type: unspecified   Qualified Code(s): D72.829 - Elevated white 

blood cell count, unspecified   


Is this a current diagnosis for this admission?: Yes   


Plan: 


Currently all resolving follow outpatients for persistent elevation of the 

leukocytosis with the hematologist








(3) Type 2 diabetes mellitus


Qualifiers: 


   Diabetes mellitus long term insulin use: with long term use   Chronic kidney 

disease stage: stage 2 (mild) 


Is this a current diagnosis for this admission?: Yes   


Plan: 


Continues to current medications








(4) Hypertension


Qualifiers: 


   Hypertension type: essential hypertension   Qualified Code(s): I10 - 

Essential (primary) hypertension   


Is this a current diagnosis for this admission?: Yes   


Plan: 


Continues to current medications








(5) Hyperlipidemia


Qualifiers: 


   Hyperlipidemia type: unspecified   Qualified Code(s): E78.5 - Hyperlipidemia,

unspecified   


Is this a current diagnosis for this admission?: Yes   





(6) Elevated troponin


Is this a current diagnosis for this admission?: Yes   


Plan: 


Follow with the cardiology








(7) Positional vertigo


Is this a current diagnosis for this admission?: Yes   


Plan: 


Continues to meclizine








(8) Dehydration


Is this a current diagnosis for this admission?: Yes   


Plan: 


Currently resolved.  The IV fluid








(9) Hyperglycemia due to type 2 diabetes mellitus


Qualifiers: 


   Diabetes mellitus long term insulin use: with long term use   Qualified 

Code(s): E11.65 - Type 2 diabetes mellitus with hyperglycemia; Z79.4 - Long term

(current) use of insulin   


Is this a current diagnosis for this admission?: Yes   


Plan: 


Currently all resolved








(10) Vomiting


Qualifiers: 


   Vomiting type: unspecified   Vomiting Intractability: unspecified   Nausea 

presence: with nausea   Qualified Code(s): R11.2 - Nausea with vomiting, unspeci

fied   


Is this a current diagnosis for this admission?: Yes   





- Time


Time Spent with patient: 15-24 minutes


Medications reviewed and adjusted accordingly: Yes


Anticipated discharge: Home


Within: within 24 hours





- Plan Summary


Plan Summary: 





Currently all stable continues to PT evaluations

## 2019-09-17 NOTE — EKG REPORT
SEVERITY:- BORDERLINE ECG -

SINUS RHYTHM

NONSPECIFIC INFERIOR ST-T CHANGES

:

Confirmed by: Nico Ballestreos MD 17-Sep-2019 07:52:18

## 2019-09-18 LAB
ADD MANUAL DIFF: NO
ALBUMIN SERPL-MCNC: 3.2 G/DL (ref 3.5–5)
ALP SERPL-CCNC: 54 U/L (ref 38–126)
ANION GAP SERPL CALC-SCNC: 8 MMOL/L (ref 5–19)
AST SERPL-CCNC: 32 U/L (ref 14–36)
BASOPHILS # BLD AUTO: 0.1 10^3/UL (ref 0–0.2)
BASOPHILS NFR BLD AUTO: 0.7 % (ref 0–2)
BILIRUB DIRECT SERPL-MCNC: 0.1 MG/DL (ref 0–0.4)
BILIRUB SERPL-MCNC: 0.5 MG/DL (ref 0.2–1.3)
BUN SERPL-MCNC: 9 MG/DL (ref 7–20)
CALCIUM: 8.9 MG/DL (ref 8.4–10.2)
CHLORIDE SERPL-SCNC: 101 MMOL/L (ref 98–107)
CO2 SERPL-SCNC: 27 MMOL/L (ref 22–30)
EOSINOPHIL # BLD AUTO: 0.2 10^3/UL (ref 0–0.6)
EOSINOPHIL NFR BLD AUTO: 2 % (ref 0–6)
ERYTHROCYTE [DISTWIDTH] IN BLOOD BY AUTOMATED COUNT: 13.3 % (ref 11.5–14)
GLUCOSE SERPL-MCNC: 156 MG/DL (ref 75–110)
HCT VFR BLD CALC: 38.2 % (ref 36–47)
HGB BLD-MCNC: 13.2 G/DL (ref 12–15.5)
LYMPHOCYTES # BLD AUTO: 3.2 10^3/UL (ref 0.5–4.7)
LYMPHOCYTES NFR BLD AUTO: 25.5 % (ref 13–45)
MCH RBC QN AUTO: 28.5 PG (ref 27–33.4)
MCHC RBC AUTO-ENTMCNC: 34.6 G/DL (ref 32–36)
MCV RBC AUTO: 82 FL (ref 80–97)
MONOCYTES # BLD AUTO: 1.2 10^3/UL (ref 0.1–1.4)
MONOCYTES NFR BLD AUTO: 9.3 % (ref 3–13)
NEUTROPHILS # BLD AUTO: 7.8 10^3/UL (ref 1.7–8.2)
NEUTS SEG NFR BLD AUTO: 62.5 % (ref 42–78)
PLATELET # BLD: 386 10^3/UL (ref 150–450)
POTASSIUM SERPL-SCNC: 4 MMOL/L (ref 3.6–5)
PROT SERPL-MCNC: 6.1 G/DL (ref 6.3–8.2)
RBC # BLD AUTO: 4.64 10^6/UL (ref 3.72–5.28)
TOTAL CELLS COUNTED % (AUTO): 100 %
WBC # BLD AUTO: 12.5 10^3/UL (ref 4–10.5)

## 2019-09-18 RX ADMIN — AMLODIPINE BESYLATE SCH: 10 TABLET ORAL at 09:52

## 2019-09-18 RX ADMIN — MECLIZINE HYDROCHLORIDE SCH MG: 25 TABLET ORAL at 15:17

## 2019-09-18 RX ADMIN — ASPIRIN SCH MG: 81 TABLET, COATED ORAL at 09:51

## 2019-09-18 RX ADMIN — INSULIN LISPRO SCH UNIT: 100 INJECTION, SOLUTION INTRAVENOUS; SUBCUTANEOUS at 15:44

## 2019-09-18 RX ADMIN — EZETIMIBE SCH MG: 10 TABLET ORAL at 09:54

## 2019-09-18 RX ADMIN — METFORMIN HYDROCHLORIDE SCH MG: 500 TABLET, FILM COATED ORAL at 17:43

## 2019-09-18 RX ADMIN — ENOXAPARIN SODIUM SCH: 40 INJECTION SUBCUTANEOUS at 09:52

## 2019-09-18 RX ADMIN — AMLODIPINE BESYLATE SCH MG: 10 TABLET ORAL at 09:49

## 2019-09-18 RX ADMIN — ATORVASTATIN CALCIUM SCH MG: 10 TABLET, FILM COATED ORAL at 22:33

## 2019-09-18 RX ADMIN — INSULIN LISPRO SCH: 100 INJECTION, SOLUTION INTRAVENOUS; SUBCUTANEOUS at 22:35

## 2019-09-18 RX ADMIN — INSULIN LISPRO SCH: 100 INJECTION, SOLUTION INTRAVENOUS; SUBCUTANEOUS at 11:00

## 2019-09-18 RX ADMIN — INSULIN GLARGINE SCH UNIT: 100 INJECTION, SOLUTION SUBCUTANEOUS at 22:33

## 2019-09-18 RX ADMIN — INSULIN LISPRO SCH UNIT: 100 INJECTION, SOLUTION INTRAVENOUS; SUBCUTANEOUS at 09:46

## 2019-09-18 RX ADMIN — MECLIZINE HYDROCHLORIDE SCH MG: 25 TABLET ORAL at 22:33

## 2019-09-18 RX ADMIN — PANTOPRAZOLE SODIUM SCH: 40 TABLET, DELAYED RELEASE ORAL at 06:47

## 2019-09-18 RX ADMIN — METFORMIN HYDROCHLORIDE SCH MG: 500 TABLET, FILM COATED ORAL at 09:51

## 2019-09-18 RX ADMIN — LISINOPRIL SCH MG: 5 TABLET ORAL at 09:50

## 2019-09-18 NOTE — PDOC PROGRESS REPORT
Subjective


Progress Note for:: 09/18/19


Subjective:: 





Patient is feeling better still having some vertigo symptoms but other than that

much improved compared to the cane


Is denied any chest pain to than any shortness of the breath


No abdominal pain no nausea no vomiting no diarrhea


Reason For Visit: 


T2DM POORLY CONTROLLED,VOMITING








Physical Exam


Vital Signs: 


                                        











Temp Pulse Resp BP Pulse Ox


 


 98.3 F   88   18   134/78 H  96 


 


 09/17/19 23:19  09/17/19 23:19  09/17/19 23:19  09/17/19 23:19  09/17/19 23:19








                                 Intake & Output











 09/17/19 09/18/19 09/19/19





 06:59 06:59 06:59


 


Intake Total 3213 2031 


 


Output Total 400 4000 


 


Balance 2813 -1969 


 


Weight 89.2 kg 87.7 kg 











General appearance: PRESENT: no acute distress, well-developed, well-nourished


Head exam: PRESENT: atraumatic, normocephalic


Eye exam: PRESENT: conjunctiva pink, EOMI, PERRLA.  ABSENT: scleral icterus


Ear exam: PRESENT: normal external ear exam


Mouth exam: PRESENT: moist, tongue midline


Neck exam: PRESENT: full ROM.  ABSENT: carotid bruit, JVD, lymphadenopathy, 

thyromegaly


Respiratory exam: PRESENT: clear to auscultation aysha


Cardiovascular exam: PRESENT: RRR.  ABSENT: diastolic murmur, rubs, systolic 

murmur


Vascular exam: PRESENT: normal capillary refill


GI/Abdominal exam: PRESENT: normal bowel sounds, soft.  ABSENT: distended, 

guarding, mass, organolmegaly, rebound, tenderness


Rectal exam: PRESENT: deferred


Extremities exam: ABSENT: pedal edema


Musculoskeletal exam: PRESENT: ambulatory


Neurological exam: PRESENT: alert, awake, oriented to person, oriented to place,

oriented to time, oriented to situation, CN II-XII grossly intact.  ABSENT: 

motor sensory deficit


Psychiatric exam: PRESENT: appropriate affect, normal mood.  ABSENT: homicidal 

ideation, suicidal ideation


Skin exam: PRESENT: dry, intact, warm.  ABSENT: cyanosis, rash





Results


Laboratory Results: 


                                        





                                 09/18/19 05:52 





                                 09/18/19 05:52 





                                        











  09/18/19 09/18/19





  05:52 05:52


 


WBC  12.5 H 


 


RBC  4.64 


 


Hgb  13.2 


 


Hct  38.2 


 


MCV  82 


 


MCH  28.5 


 


MCHC  34.6 


 


RDW  13.3 


 


Plt Count  386 


 


Seg Neutrophils %  62.5 


 


Sodium   135.6 L


 


Potassium   4.0


 


Chloride   101


 


Carbon Dioxide   27


 


Anion Gap   8


 


BUN   9


 


Creatinine   0.74


 


Est GFR (African Amer)   > 60


 


Glucose   156 H


 


Calcium   8.9


 


Total Bilirubin   0.5


 


AST   32


 


Alkaline Phosphatase   54


 


Total Protein   6.1 L


 


Albumin   3.2 L








                                        











  09/15/19 09/15/19 09/16/19





  16:40 21:45 05:18


 


Creatine Kinase   


 


CK-MB (CK-2)   


 


Troponin I  0.051  0.138  0.142














  09/16/19 09/16/19 09/16/19





  05:18 05:18 10:34


 


Creatine Kinase  34   34


 


CK-MB (CK-2)   0.36 


 


Troponin I   Cancelled 














  09/16/19 09/16/19 09/16/19





  10:34 17:45 17:45


 


Creatine Kinase   34 


 


CK-MB (CK-2)  0.41   0.30


 


Troponin I  0.120   0.091











Impressions: 


                                        





Brain MRI with MRA  09/16/19 00:00


IMPRESSION:  NO ENHANCING LESIONS. MINIMAL MICROVASCULAR ISCHEMIC CHANGE. 

OTHERWISE NORMAL STUDY.


EVIDENCE OF ACUTE STROKE: NO.


 








Chest X-Ray  09/16/19 00:00


IMPRESSION:  NO ACUTE RADIOGRAPHIC FINDING IN THE CHEST.


 








Head MRI  09/16/19 00:00


IMPRESSION:  NO ENHANCING LESIONS. MINIMAL MICROVASCULAR ISCHEMIC CHANGE. 

OTHERWISE NORMAL STUDY.


EVIDENCE OF ACUTE STROKE: NO.


 














Assessment & Plan





- Diagnosis


(1) Dizziness


Is this a current diagnosis for this admission?: Yes   


Plan: 


Currently all resolving








(2) Leukocytosis


Qualifiers: 


   Leukocytosis type: unspecified   Qualified Code(s): D72.829 - Elevated white 

blood cell count, unspecified   


Is this a current diagnosis for this admission?: Yes   


Plan: 


Outpatients hematologist as per discussed with her








(3) Type 2 diabetes mellitus


Qualifiers: 


   Diabetes mellitus long term insulin use: with long term use   Chronic kidney 

disease stage: stage 2 (mild) 


Is this a current diagnosis for this admission?: Yes   


Plan: 


Continues to current medications








(4) Hypertension


Qualifiers: 


   Hypertension type: essential hypertension   Qualified Code(s): I10 - 

Essential (primary) hypertension   


Is this a current diagnosis for this admission?: Yes   


Plan: 


Continues to current medications








(5) Hyperlipidemia


Qualifiers: 


   Hyperlipidemia type: unspecified   Qualified Code(s): E78.5 - Hyperlipidemia,

unspecified   


Is this a current diagnosis for this admission?: Yes   





(6) Elevated troponin


Is this a current diagnosis for this admission?: Yes   


Plan: 


As per discussed with the cardiology follow outpatient patient is currently 

having no acute coronary symptoms








(7) Positional vertigo


Is this a current diagnosis for this admission?: Yes   


Plan: 


To use the meclizine 25 mg p.o. every 8








(8) Dehydration


Is this a current diagnosis for this admission?: Yes   


Plan: 


Currently resolved.  The IV fluid








(9) Hyperglycemia due to type 2 diabetes mellitus


Qualifiers: 


   Diabetes mellitus long term insulin use: with long term use   Qualified 

Code(s): E11.65 - Type 2 diabetes mellitus with hyperglycemia; Z79.4 - Long term

(current) use of insulin   


Is this a current diagnosis for this admission?: Yes   


Plan: 


Currently all resolved








(10) Vomiting


Qualifiers: 


   Vomiting type: unspecified   Vomiting Intractability: unspecified   Nausea 

presence: with nausea   Qualified Code(s): R11.2 - Nausea with vomiting, 

unspecified   


Is this a current diagnosis for this admission?: Yes   





- Time


Time Spent with patient: 15-24 minutes


Medications reviewed and adjusted accordingly: Yes


Anticipated discharge: Home


Within: within 24 hours





- Plan Summary


Plan Summary: 





Continues to physical therapy continues to meclizine hopefully discharge very 

soon

## 2019-09-19 VITALS — SYSTOLIC BLOOD PRESSURE: 134 MMHG | DIASTOLIC BLOOD PRESSURE: 79 MMHG

## 2019-09-19 RX ADMIN — AMLODIPINE BESYLATE SCH MG: 10 TABLET ORAL at 11:12

## 2019-09-19 RX ADMIN — METFORMIN HYDROCHLORIDE SCH MG: 500 TABLET, FILM COATED ORAL at 11:11

## 2019-09-19 RX ADMIN — MECLIZINE HYDROCHLORIDE SCH MG: 25 TABLET ORAL at 05:18

## 2019-09-19 RX ADMIN — PANTOPRAZOLE SODIUM SCH MG: 40 TABLET, DELAYED RELEASE ORAL at 05:17

## 2019-09-19 RX ADMIN — ASPIRIN SCH MG: 81 TABLET, COATED ORAL at 11:12

## 2019-09-19 RX ADMIN — ENOXAPARIN SODIUM SCH: 40 INJECTION SUBCUTANEOUS at 11:12

## 2019-09-19 RX ADMIN — EZETIMIBE SCH MG: 10 TABLET ORAL at 11:11

## 2019-09-19 RX ADMIN — LISINOPRIL SCH MG: 5 TABLET ORAL at 11:12

## 2019-09-19 RX ADMIN — INSULIN LISPRO SCH: 100 INJECTION, SOLUTION INTRAVENOUS; SUBCUTANEOUS at 08:00

## 2019-09-19 NOTE — PDOC DISCHARGE SUMMARY
General





- Admit/Disc Date/PCP


Admission Date/Primary Care Provider: 


  09/15/19 14:49





  ARIAS BOLIVAR MD





Discharge Date: 09/19/19





- Discharge Diagnosis


(1) Dizziness


Is this a current diagnosis for this admission?: Yes   


Summary: 


Patient MRI/MRA all negative


Most likely related to the benign positional vertigo currently also has stable








(2) Leukocytosis


Is this a current diagnosis for this admission?: Yes   


Summary: 


Currently all stable follow outpatients to hematologist








(3) Type 2 diabetes mellitus


Is this a current diagnosis for this admission?: Yes   


Summary: 


She also see a endocrinologist currently continues Insulin Metformin and GLP-1 

and currently hold the glipizide








(4) Hypertension


Is this a current diagnosis for this admission?: Yes   


Summary: 


Currently all stable








(5) Hyperlipidemia


Is this a current diagnosis for this admission?: Yes   


Summary: 


Continues to current medications








(6) Elevated troponin


Is this a current diagnosis for this admission?: Yes   


Summary: 


Patient seen by Dr. Lee follow outpatient stress test and according to 

the Dr. Lee no acute coronary syndrome patient's troponin T is negative 

which is more sensitive for the MI








(7) Positional vertigo


Is this a current diagnosis for this admission?: Yes   


Summary: 


Continues to meclizine if his persist follow outpatient ENT








(8) Dehydration


Is this a current diagnosis for this admission?: Yes   


Summary: 


Currently all resolved








(9) Hyperglycemia due to type 2 diabetes mellitus


Is this a current diagnosis for this admission?: Yes   


Summary: 


Currently all resolved








(10) Vomiting


Is this a current diagnosis for this admission?: Yes   


Summary: 


Currently all resolved








- Additional Information


Resuscitation Status: Full Code


Discharge Diet: Diabetic


Discharge Activity: Activity As Tolerated


Prescriptions: 


Meclizine HCl [Antivert 25 mg Tablet] 25 mg PO Q8 #21 tablet


Home Medications: 








Amlodipine Besylate [Norvasc 10 mg Tablet] 10 mg PO DAILY 09/16/19 


Ezetimibe [Zetia 10 mg Tablet] 10 mg PO DAILY 09/16/19 


Insulin Detemir [Levemir] 18 units SQ QHS 09/16/19 


Lisinopril [Prinivil 2.5 mg Tablet] 2.5 mg PO DAILY 09/16/19 


Metformin HCl [Metformin HCl ER] 1,000 mg PO BIDBS 09/16/19 


Rosuvastatin Calcium [Crestor 5 mg Tablet] 5 mg PO QHS 09/16/19 


Semaglutide [Ozempic] 1 mg SQ MO@1900 09/16/19 


Hydrochlorothiazide [Hydrodiuril 25 mg Tablet] 12.5 mg PO QAM #0 09/19/19 


Meclizine HCl [Antivert 25 mg Tablet] 25 mg PO Q8 #21 tablet 09/19/19 











History of Present Illness


History of Present Illness: 


WALLY ARMENDARIZ is a 48 year old female


Is a 48-year-old female has a history of the type 2 diabetes currently see the 

endocrinologist history of the hypertension's hyperlipidemia chronic kidney dise

ase history of the chronic leukocytosis negative flow cytometry seen by the 

hematologist last year came to the emergency department with a complaint of 

dizziness started 24 hours before the came


Patient describing the feel very imbalance when she moves her head patients 

denied any visual problems denied any weakness


Patient denied any cough no congestions no chest pain


Does feel like a sweaty but no fever no chills


Denied any tick bites


Denied any contact with the sick persons


In the emergency department patient initial work-up was suggesting hyperglycemia

and dehydration's and patient started receiving the IV fluid


When I saw the patient on the floor patient was feeling okay except still have 

ongoing dizziness when the patient's move the head which related to the mostly 

vertigo type symptoms but no other neurological symptoms


Patient's troponin was elevated and discussed with the cardiology Dr. Lee

order some testing to further evaluate unlikely to be an acute myocardial infect

ion is most likely mismatch troponin elevated


Patient's white count was 22 but patients denied any fever denied any abdominal 

pain no nausea no vomiting anymore was complaining initially no loose stool 

anymore


Patient seen by the hematologist in the past with persistent elevated white 

count with the flow cytometry was negative








Hospital Course


Hospital Course: 





This is a 48-year-old female presented emergency department with the dizziness 

vomitings dehydration's not feeling well patient initial work-up was suggesting 

hyperglycemia and dehydration's


Will start on IV fluids and patient underwent for the MRI and MRA which is 

negative for any acute neurological symptoms for the dizziness and the vertigo


Cardiac enzyme was elevated seen by Dr. Lee and suggest mismatch not any 

acute coronary syndrome and he ordered a troponin T which he said that all 

negative's and patients follow outpatient stress test


Is never complaining any chest pain


Patient's otherwise have a persistent leukocytosis which patient have a before 

seen by the hematologist in the past again reconsulted and suggest to follow 

outpatients for continued further evaluations but no sign of any sepsis


Patient seen by the physical therapy walk with the physical therapy without any 

problems patient's vertigo is much improved and other medical problem is all 

stable


Since see outpatients endocrinologist and nephrologist continues to follow that


Follow outpatients cardiology if needed ENT


No nausea no warm eating no diarrhea and p.o. intake is good





Physical Exam


Vital Signs: 


                                        











Temp Pulse Resp BP Pulse Ox


 


 98.3 F   81   16   134/79 H  96 


 


 09/19/19 13:59  09/19/19 13:59  09/19/19 13:59  09/19/19 13:59  09/19/19 13:59








                                 Intake & Output











 09/18/19 09/19/19 09/20/19





 06:59 06:59 06:59


 


Intake Total 2031 1540 


 


Output Total 4000  


 


Balance -1969 1540 


 


Weight 87.7 kg 87.6 kg 











General appearance: PRESENT: no acute distress, well-developed, well-nourished


Head exam: PRESENT: atraumatic, normocephalic


Eye exam: PRESENT: conjunctiva pink, EOMI, PERRLA.  ABSENT: scleral icterus


Ear exam: PRESENT: normal external ear exam


Mouth exam: PRESENT: moist, tongue midline


Neck exam: PRESENT: full ROM.  ABSENT: carotid bruit, JVD, lymphadenopathy, t

hyromegaly


Respiratory exam: PRESENT: clear to auscultation aysha


Cardiovascular exam: PRESENT: RRR.  ABSENT: diastolic murmur, rubs, systolic 

murmur


Pulses: PRESENT: normal dorsalis pedis pul, +2 pedal pulses bilateral


Vascular exam: PRESENT: normal capillary refill


GI/Abdominal exam: PRESENT: normal bowel sounds, soft.  ABSENT: distended, 

guarding, mass, organolmegaly, rebound, tenderness


Rectal exam: PRESENT: deferred


Extremities exam: ABSENT: pedal edema


Musculoskeletal exam: PRESENT: ambulatory


Neurological exam: PRESENT: alert, awake, oriented to person, oriented to place,

oriented to time, oriented to situation, CN II-XII grossly intact.  ABSENT: 

motor sensory deficit


Psychiatric exam: PRESENT: appropriate affect, normal mood.  ABSENT: homicidal 

ideation, suicidal ideation


Skin exam: PRESENT: dry, intact, warm.  ABSENT: cyanosis, rash





Results


Laboratory Results: 


                                        





                                 09/18/19 05:52 





                                 09/18/19 05:52 





                                        











  09/18/19





  18:38


 


Stool for White Cells  NO WBCs SEEN








                                        











  09/15/19 09/15/19 09/16/19





  16:40 21:45 05:18


 


Creatine Kinase   


 


CK-MB (CK-2)   


 


Troponin I  0.051  0.138  0.142














  09/16/19 09/16/19 09/16/19





  05:18 05:18 10:34


 


Creatine Kinase  34   34


 


CK-MB (CK-2)   0.36 


 


Troponin I   Cancelled 














  09/16/19 09/16/19 09/16/19





  10:34 17:45 17:45


 


Creatine Kinase   34 


 


CK-MB (CK-2)  0.41   0.30


 


Troponin I  0.120   0.091











Impressions: 


                                        





Brain MRI with MRA  09/16/19 00:00


IMPRESSION:  NO ENHANCING LESIONS. MINIMAL MICROVASCULAR ISCHEMIC CHANGE. 

OTHERWISE NORMAL STUDY.


EVIDENCE OF ACUTE STROKE: NO.


 








Chest X-Ray  09/16/19 00:00


IMPRESSION:  NO ACUTE RADIOGRAPHIC FINDING IN THE CHEST.


 








Head MRI  09/16/19 00:00


IMPRESSION:  NO ENHANCING LESIONS. MINIMAL MICROVASCULAR ISCHEMIC CHANGE. 

OTHERWISE NORMAL STUDY.


EVIDENCE OF ACUTE STROKE: NO.


 














Qualifiers





- *


PATIENT BEING DISCHARGED WITH ANY OF THE FOLLOWING DIAGNOSIS: No





Acute Heart Failure





- **


Is this a Heart Failure Patient?: No





Plan


Time Spent: Greater than 30 Minutes - Follow outpatients cardiology Follow in 

office 1 week